# Patient Record
Sex: MALE | Race: WHITE
[De-identification: names, ages, dates, MRNs, and addresses within clinical notes are randomized per-mention and may not be internally consistent; named-entity substitution may affect disease eponyms.]

---

## 2017-01-23 ENCOUNTER — HOSPITAL ENCOUNTER (OUTPATIENT)
Dept: HOSPITAL 53 - M ONCR | Age: 67
End: 2017-01-23
Attending: RADIOLOGY
Payer: COMMERCIAL

## 2017-01-23 DIAGNOSIS — C61: Primary | ICD-10-CM

## 2017-01-25 ENCOUNTER — HOSPITAL ENCOUNTER (OUTPATIENT)
Dept: HOSPITAL 53 - M ONCR | Age: 67
End: 2017-01-25
Attending: RADIOLOGY
Payer: COMMERCIAL

## 2017-01-25 DIAGNOSIS — C61: Primary | ICD-10-CM

## 2017-02-06 ENCOUNTER — HOSPITAL ENCOUNTER (EMERGENCY)
Dept: HOSPITAL 53 - M ED | Age: 67
Discharge: HOME | End: 2017-02-06
Payer: MEDICARE

## 2017-02-06 DIAGNOSIS — Z79.899: ICD-10-CM

## 2017-02-06 DIAGNOSIS — E78.00: ICD-10-CM

## 2017-02-06 DIAGNOSIS — I10: ICD-10-CM

## 2017-02-06 DIAGNOSIS — Z85.46: ICD-10-CM

## 2017-02-06 DIAGNOSIS — Z72.0: ICD-10-CM

## 2017-02-06 DIAGNOSIS — R07.9: Primary | ICD-10-CM

## 2017-02-06 LAB
ANION GAP SERPL CALC-SCNC: 9 MEQ/L (ref 8–16)
BASOPHILS # BLD AUTO: 0 K/MM3 (ref 0–0.2)
BASOPHILS NFR BLD AUTO: 0.7 % (ref 0–1)
BUN SERPL-MCNC: 8 MG/DL (ref 7–18)
CALCIUM SERPL-MCNC: 9.3 MG/DL (ref 8.8–10.2)
CHLORIDE SERPL-SCNC: 99 MEQ/L (ref 98–107)
CO2 SERPL-SCNC: 28 MEQ/L (ref 21–32)
CREAT SERPL-MCNC: 0.78 MG/DL (ref 0.7–1.3)
EOSINOPHIL # BLD AUTO: 0.2 K/MM3 (ref 0–0.5)
EOSINOPHIL NFR BLD AUTO: 3.1 % (ref 0–3)
ERYTHROCYTE [DISTWIDTH] IN BLOOD BY AUTOMATED COUNT: 13 % (ref 11.5–14.5)
GFR SERPL CREATININE-BSD FRML MDRD: > 60 ML/MIN/{1.73_M2} (ref 49–?)
GLUCOSE SERPL-MCNC: 101 MG/DL (ref 80–110)
LARGE UNSTAINED CELL #: 0.2 K/MM3 (ref 0–0.4)
LARGE UNSTAINED CELL %: 3 % (ref 0–4)
LYMPHOCYTES # BLD AUTO: 1 K/MM3 (ref 1.5–4.5)
LYMPHOCYTES NFR BLD AUTO: 15.7 % (ref 24–44)
MCH RBC QN AUTO: 32.2 PG (ref 27–33)
MCHC RBC AUTO-ENTMCNC: 34.4 G/DL (ref 32–36.5)
MCV RBC AUTO: 93.8 FL (ref 80–96)
MONOCYTES # BLD AUTO: 0.4 K/MM3 (ref 0–0.8)
MONOCYTES NFR BLD AUTO: 6.2 % (ref 0–5)
NEUTROPHILS # BLD AUTO: 4.6 K/MM3 (ref 1.8–7.7)
NEUTROPHILS NFR BLD AUTO: 71.2 % (ref 36–66)
PLATELET # BLD AUTO: 241 K/MM3 (ref 150–450)
POTASSIUM SERPL-SCNC: 3.1 MEQ/L (ref 3.5–5.1)
SODIUM SERPL-SCNC: 136 MEQ/L (ref 136–145)
WBC # BLD AUTO: 6.5 K/MM3 (ref 4–10)

## 2017-02-06 NOTE — REP
Chest one-view

 

HISTORY: Chest pain

 

Comparison: 05/04/2016

 

The lungs are clear.  The heart is normal in size.  The pulmonary vasculature is

normal in appearance.

 

Impression:  No acute disease.

 

 

Signed by

Andrea Lovett MD 02/06/2017 05:46 P

## 2017-02-06 NOTE — EDDOCDS
Nurse's Notes                                                                                     

[facility]                                                                         

Name: Francesco Castañeda                                                                               

Age: 66 yrs                                                                                       

Sex: Male                                                                                         

: 1950                                                                                   

MRN: [id number]                                                                                     

Arrival Date: 2017                                                                          

Time: 16:46                                                                                       

Account#: [id number]                                                                              

Bed 12                                                                                            

Private MD: Donavon Carlos M                                                                      

Diagnosis: Chest pain, unspecified                                                                

                                                                                                  

Presentation:                                                                                     

                                                                                             

16:54 Presenting complaint: Patient states: pt c/o chest pain since last night. denies sob.   ead 

      Aspirin was not taken prior to arrival. Adult Sepsis Screening: The patient does not        

      have new or worsening altered mentation. Patient's respiratory rate is less than 22.        

      Systolic blood pressure is greater than 100. Patient has a qSOFA score of 0- Negative       

      Sepsis Screen. Suicide/Homicide risk assessment- the patient denies having any suicidal     

      and/or homicidal ideations and does not present with any other emotional, behavioral or     

      mental health complaints.  Status: Patient is not a  or       

      dependent. Transition of care: patient was not received from another setting of care.       

16:54 Acuity: MAYI Level 3                                                                     ead 

16:54 Method Of Arrival: Walkin/Carried/Asstd                                                 ead 

17:00 Red Flag criteria, patient assessed and taken directly to a bed.                        ead 

                                                                                                  

Triage Assessment:                                                                                

16:57 General: Appears in no apparent distress, Behavior is appropriate for age, cooperative. ead 

      Pain: Location: diaphragm and left breast Pain currently is 3 out of 10 on a pain           

      scale. At worst was 5 out of 10 on a pain scale. Neurological: No deficits noted.           

      Cardiovascular: Chest pain is described as mild, radiates Does not radiate. episodes        

      are intermittent began last night. Respiratory: Denies shortness of breath. Derm: Skin      

      is pink, warm & dry.                                                                      

                                                                                                  

Historical:                                                                                       

- Home Meds:                                                                                      

1. Chlorthalidone Oral once daily                                                               

2. Omeprazole Oral once daily                                                                   

3. Amlodipine Oral once daily                                                                   

4. Acyclovir Unknown Oral once a day                                                            

5. Oxybutynin Chloride Oral once daily                                                          

6. atorvastatin oral oral once daily                                                            

- PMHx: prostate cancer; Hypertension; Hypercholesterolemia;                                      

- PSHx: Right Undescended Testicle Surgery; Right Femur/Hip Fracture Repair; bilateral            

shoulder fractures;                                                                             

- Social history: Smoking status: Patient uses tobacco products, current every day                

smoker. No barriers to communication noted, The patient speaks fluent English, Speaks           

appropriately for age, Smoking status: .                                                        

- Family history: Not pertinent.                                                                  

- : The pt / caregiver states he / she is not on anticoagulants. Home medication list             

is obtained from the patient.                                                                   

- Exposure Risk Screening:: None identified.                                                      

                                                                                                  

                                                                                                  

Screenin:50 Screening information is obtained from the patient. Fall risk: No risks identified.     rs3 

      Assistance ADL's: requires no assistance with activities of daily living. Abuse/DV          

      Screen: The patient / caregiver reports he/she is: not in a situation that causes fear,     

      pain or injury. Nutritional screening: No deficits noted. Advance Directives:               

      Currently, there is no health care proxy. home support is adequate.                         

                                                                                                  

Assessment:                                                                                       

17:49 General: Appears in no apparent distress, Behavior is appropriate for age, cooperative. rs3 

      Pain: Location: chest. Neurological: Level of Consciousness is awake, alert, Oriented       

      to person, place, time. Cardiovascular: Rhythm is regular Chest pain is denied.             

      Respiratory: Airway is patent Respiratory effort is even, unlabored, Respiratory            

      pattern is regular, symmetrical, Breath sounds are clear bilaterally. Derm: Skin is         

      pink, warm & dry.                                                                         

18:37 General: Appears in no apparent distress, Behavior is cooperative, denies of chest      rs3 

      pain/distress. resting comfortable on stretcher. waiting for test results. .                

19:05 General: Verbal report given by Vidhi CANTRELL RN. Assumed care of patient at this time..      kas2

                                                                                                  

Vital Signs:                                                                                      

16:48  / 73; Pulse 90; Resp 18; Temp 97.6(O); Pulse Ox 96% on R/A; Weight 82.1 kg (R);  ct3 

      Height 5 ft. 9 in. (175.26 cm) (R); Pain 5/10;                                              

19:06  / 83; Pulse 85; Resp 18; Temp 99.0(TE); Pulse Ox 95% on R/A; Pain 0/10;          mdr 

16:48 Body Mass Index 26.73 (82.10 kg, 175.26 cm)                                             ct3 

                                                                                                  

Vitals:                                                                                           

16:48 Log In Time: 2017 at 16:45.                                                ct3 

16:49 RN notified that patient meets Red Flag criteria.                                       ct3 

                                                                                                  

ED Course:                                                                                        

16:47 Patient visited by Alyssa Bell PCA.                                              ct3 

16:47 Donavon Carlos is Private Physician.                                                     ct3 

16:47 Patient moved to Waiting                                                                ct3 

16:52 Arminda Blakely,RN is Primary Nurse.                                                 ead 

16:52 Patient moved to 12                                                                     ead 

16:55 Triage Initiated                                                                        ead 

16:58 Cardiac monitor on. Pulse ox on. NIBP on.                                               ead 

17:02 Patient visited by Omar Kincaid.                                                       jml1

17:02 EKG done. (by ED staff). Reviewed by Maja Lundborg-Gray MD.                             jml1

17:08 Lundborg-Gray, Maja, MD is Attending Physician.                                         ml  

17:08 Patient visited by Lundborg-Gray, Maja, MD.                                             ml  

17:49 Patient visited by Arminda Balkely RN.                                               rs3 

17:49 D-Dimer Quant Sent.                                                                     rs3 

17:49 CBC with Diff Sent.                                                                     rs3 

17:49 MED Profile Sent.                                                                       rs3 

17:49 CIP Sent.                                                                               rs3 

17:49 Troponin Sent.                                                                          rs3 

17:50 Inserted saline lock: 20 gauge in left antecubital area and blood collected. Labs       rs3 

      drawn. (by ED staff).                                                                       

18:28 Chest, 1 View Returned.                                                                 EDMS

18:37 Patient visited by Arminda Blakely RN.                                               rs3 

18:55 Donavon Carlos is Referral Physician.                                                    ml  

19:05 Patient visited by Fawn Yi RN.                                                        kas2

19:06 Patient visited by Esequiel Norman PCA.                                                 mdr 

19:13 Discontinued IV bleeding controlled, pressure dressing applied, No redness/swelling at  kas2

      site. No procedures done that require assistance.                                           

19:14 Patient visited by Fawn Yi RN.                                                        kas2

19:14 The patient / caregiver is instructed regarding the plan of care and ED course.         kas2

                                                                                                  

Administered Medications:                                                                         

17:49 Drug: Aspirin 324 mg [aspirin 81 mg chewable tablet (4 tabs)] Route: PO;                rs3 

                                                                                                  

                                                                                                  

Order Results:                                                                                    

Lab Order: CBC with Diff; SPEC'M 17 17:36                                                   

      Test: WHITE BLOOD COUNT; Value: 6.5; Range: 4.0-10.0; Units: K/mm3; Status: F               

      Test: RED BLOOD COUNT; Value: 4.35; Range: 4.30-6.10; Units: M/mm3; Status: F               

      Test: HEMOGLOBIN; Value: 14.0; Range: 14.0-18.0; Units: g/dl; Status: F                     

      Test: HEMATOCRIT; Value: 40.8; Range: 42.0-52.0; Abnormal: Below low normal; Units: %;      

      Status: F                                                                                   

      Test: MEAN CORPUSCULAR VOLUME; Value: 93.8; Range: 80.0-96.0; Units: fl; Status: F          

      Test: MEAN CORPUSCULAR HEMOGLOBIN; Value: 32.2; Range: 27.0-33.0; Units: pg; Status: F      

      Test: MEAN CORPUSCULAR HGB CONC; Value: 34.4; Range: 32.0-36.5; Units: g/dl; Status: F      

      Test: RED CELL DISTRIBUTION WIDTH; Value: 13.0; Range: 11.5-14.5; Units: %; Status: F       

      Test: PLATELET COUNT, AUTOMATED; Value: 241; Range: 150-450; Units: k/mm3; Status: F        

      Test: NEUTROPHILS %; Value: 71.2; Range: 36.0-66.0; Abnormal: Above high normal; Units:     

      %; Status: F                                                                                

      Test: LYMPH %; Value: 15.7; Range: 24.0-44.0; Abnormal: Below low normal; Units: %;         

      Status: F                                                                                   

      Test: MONO %; Value: 6.2; Range: 0.0-5.0; Abnormal: Above high normal; Units: %;            

      Status: F                                                                                   

      Test: EOS %; Value: 3.1; Range: 0.0-3.0; Abnormal: Above high normal; Units: %; Status:     

      F                                                                                           

      Test: BASO %; Value: 0.7; Range: 0.0-1.0; Units: %; Status: F                               

      Test: LARGE UNSTAINED CELL %; Value: 3.0; Range: 0.0-4.0; Units: %; Status: F               

      Test: NEUTROPHILS #; Value: 4.6; Range: 1.8-7.7; Units: K/mm3; Status: F                    

      Test: LYMPH #; Value: 1.0; Range: 1.5-4.5; Abnormal: Below low normal; Units: K/mm3;        

      Status: F                                                                                   

      Test: MONO #; Value: 0.4; Range: 0.0-0.8; Units: K/mm3; Status: F                           

      Test: EOS #; Value: 0.2; Range: 0.0-0.50; Units: K/mm3; Status: F                           

      Test: BASO #; Value: 0.0; Range: 0.0-0.2; Units: K/mm3; Status: F                           

      Test: LARGE UNSTAINED CELL #; Value: 0.2; Range: 0.0-0.4; Units: K/mm3; Status: F           

Lab Order: MED Profile; SPEC'M 17 17:36                                                     

      Test: GLUCOSE, FASTING; Value: 101; Range: ; Units: MG/DL; Status: F                  

      Test: BLOOD UREA NITROGEN; Value: 8; Range: 7-18; Units: MG/DL; Status: F                   

      Test: CREATININE FOR GFR; Value: 0.78; Range: 0.70-1.30; Units: MG/DL; Status: F            

      Test: GLOMERULAR FILTRATION RATE; Value: > 60.0; Range: >49; Status: F                      

      Test: SODIUM LEVEL; Value: 136; Range: 136-145; Units: MEQ/L; Status: F                     

      Test: POTASSIUM SERUM; Value: 3.1; Range: 3.5-5.1; Abnormal: Below low normal; Units:       

      MEQ/L; Status: F                                                                            

      Test: CHLORIDE LEVEL; Value: 99; Range: ; Units: MEQ/L; Status: F                     

      Test: CARBON DIOXIDE LEVEL; Value: 28; Range: 21-32; Units: MEQ/L; Status: F                

      Test: ANION GAP; Value: 9; Range: 8-16; Units: MEQ/L; Status: F                             

      Test: CALCIUM LEVEL; Value: 9.3; Range: 8.8-10.2; Units: MG/DL; Status: F                   

      Test Note: &nbsp;; Units are mL/min/1.73 m2 Chronic Kidney Disease Staging per NKF:       

      Stage I & II GFR >=60 Normal to Mildly Decreased Stage III GFR 30-59 Moderately           

      Decreased Stage IV GFR 15-29 Severely Decreased Stage V GFR <15 Very Little GFR Left        

      ESRD GFR <15 on RRT                                                                         

Lab Order: CIP; SPEC'17 17:36                                                             

      Test: CPK CREATINE PHOSPHOKINASE; Value: 179; Range: ; Units: U/L; Status: F          

      Test: CK-MB VALUE MASS; Value: 2.6; Range: 0.0-3.6; Units: NG/ML; Status: F                 

      Test: MB/CK RELATIVE INDEX; Value: 1.45; Range: < OR =4; Status: F                          

      Test Note: &nbsp;; DIAGNOSIS CRITERIA MMB ng/ml Relative Index (RI) NON-AMI < or = 5      

      N/A GRAY ZONE > 5 < or = 4 AMI > 5 > 4                                                      

Lab Order: Troponin; SPEC'M 17 17:36                                                        

      Test: TROPONIN I; Value: < 0.02; Range: < 0.10; Units: NG/ML; Status: F                     

      Test Note: &nbsp;; Troponin I Reference Interval for Siemens Ennis LOCI: 99th             

      Percentile= 0.00-0.045 ng/ml Risk Stratification: <= 0.10 ng/ml Decreased Risk for          

      Adverse Clinical Events. 0.10-1.50 ng/ml Increased Risk for Adverse Clinical Events.        

      Evaluation of additional criterion and/or repeat testing in 2-6 hours is suggested to       

      rule out myocardial damage. >= 1.50 ng/ml Indicative of Myocardial Injury.                  

Lab Order: D-Dimer Quant; SPEC'M 17 17:36                                                   

      Test: D-DIMER QUANT; Value: 299.7; Range: <500; Units: ng/ml; Status: F                     

                                                                                                  

Radiology Order: Chest, 1 View                                                                    

      Test: Chest, 1 View                                                                         

      REASON FOR EXAMINATION: Chest Pain; Chest one-view; ; HISTORY: Chest pain; ;                

      Comparison: 2016; ; The lungs are clear. The heart is normal in size. The             

      pulmonary vasculature is; normal in appearance.; ; Impression: No acute disease.; ; ;       

      Signed by; Andrea Lovett MD 2017 05:46 P;                                             

Outcome:                                                                                          

18:56 Discharge ordered by Provider.                                                            

19:13 Discharge Assessment: patient administered narcotics - no. The following High Risk      [facility]

      Discharge criteria are identified: None. Discharged to home ambulatory. Condition: good     

      Condition: stable Condition: improved.                                                      

19:14 No special radiology studies were completed. Property :Personal belongings accompany Pt.[facility]

19:14 Patient left the ED.                                                                    [facility]

                                                                                                  

Signatures:                                                                                       

Dispatcher [employer]                           EDMS Lundborg-Gray, Maja, MD MD ml Soosairaj,Rosemary,RN                   RN   rs3                                                  

Alyssa Bell, PCA                  PCA  ct3                                                  

Omar Kincaid                                jml1                                                 

Susan Barkley RN                        RN   Esequiel Jiménez, PCA                     PCA  Fawn Guzman RN                            RN   kas2                                                 

                                                                                                  

**************************************************************************************************

MTDD

## 2017-02-06 NOTE — EDDOCDS
Physician Documentation                                                                           

[facility]                                                                         

Name: Francesco Castañeda                                                                               

Age: 66 yrs                                                                                       

Sex: Male                                                                                         

: 1950                                                                                   

MRN: [id number]                                                                                     

Arrival Date: 2017                                                                          

Time: 16:46                                                                                       

Account#: [id number]                                                                              

Bed 12                                                                                            

Private MD: Donavon Carlos M                                                                      

Disposition:                                                                                      

17 18:56 Discharged to Home/Self Care. Impression: Chest pain, unspecified.                 

- Condition is Stable.                                                                            

- Discharge Instructions: Nonspecific Chest Pain.                                                 

- Prescriptions for Aspirin 81 mg - take 1 tablet by ORAL route once daily; 90 tablet.            

- Medication Reconciliation, Local Pharmacy Hours form.                                           

- Follow up: Donavon Carlos; When: 1 - 2 days.                                                     

- Problem is new.                                                                                 

- Symptoms have improved.                                                                         

- Notes: call to follow up w Robert Carlos. reurn if worsening symptomg                              

                                                                                                  

                                                                                                  

Historical:                                                                                       

- Home Meds:                                                                                      

1. Chlorthalidone Oral once daily                                                               

2. Omeprazole Oral once daily                                                                   

3. Amlodipine Oral once daily                                                                   

4. Acyclovir Unknown Oral once a day                                                            

5. Oxybutynin Chloride Oral once daily                                                          

6. atorvastatin oral oral once daily                                                            

- PMHx: prostate cancer; Hypertension; Hypercholesterolemia;                                      

- PSHx: Right Undescended Testicle Surgery; Right Femur/Hip Fracture Repair; bilateral            

shoulder fractures;                                                                             

- Social history: Smoking status: Patient uses tobacco products, current every day                

smoker. No barriers to communication noted, The patient speaks fluent English, Speaks           

appropriately for age, Smoking status: .                                                        

- Family history: Not pertinent.                                                                  

- : The pt / caregiver states he / she is not on anticoagulants. Home medication list             

is obtained from the patient.                                                                   

- Exposure Risk Screening:: None identified.                                                      

                                                                                                  

                                                                                                  

Vital Signs:                                                                                      

                                                                                             

16:48  / 73; Pulse 90; Resp 18; Temp 97.6(O); Pulse Ox 96% on R/A; Weight 82.1 kg / 181 ct3 

      lbs (R); Height 5 ft. 9 in. (175.26 cm) (R); Pain 5/10;                                     

19:06  / 83; Pulse 85; Resp 18; Temp 99.0(TE); Pulse Ox 95% on R/A; Pain 0/10;          mdr 

16:48 Body Mass Index 26.73 (82.10 kg, 175.26 cm)                                             ct3 

                                                                                                  

MDM:                                                                                              

16:55 ECG WITH READING ER PHYS+CARDIAG ordered.                                               EDMS

17:30 IV Saline Lock ordered.                                                                 ml  

17:31 Aspirin Chewable Tablet 324 mg PO once ordered.                                         ml  

17:31 CBC with Diff Ordered.                                                                  EDMS

17:31 MED Profile Ordered.                                                                    EDMS

17:32 CIP Ordered.                                                                            EDMS

17:32 Troponin Ordered.                                                                       EDMS

17:32 D-Dimer Quant Ordered.                                                                  EDMS

17:32 Chest, 1 View Ordered.                                                                  EDMS

18:32 CBC with Diff Reviewed.                                                                 ml  

18:32 MED Profile Reviewed.                                                                   ml  

18:32 CIP Reviewed.                                                                           ml  

18:32 Troponin Reviewed.                                                                      ml  

18:32 D-Dimer Quant Reviewed.                                                                 ml  

18:32 Chest, 1 View Reviewed.                                                                 ml  

18:37 Financial registration complete.                                                        gjb 

18:57 Misc. Nursing Order ordered.                                                            ml  

                                                                                                  

Administered Medications:                                                                         

17:49 Drug: Aspirin 324 mg [aspirin 81 mg chewable tablet (4 tabs)] Route: PO;                rs3 

                                                                                                  

                                                                                                  

Signatures:                                                                                       

Dispatcher [employer]                           EDMS                                                 

Lundborg-Gray, Maja, MD MD ml Dunaway,Emily,RN                        Cassidy Workman Kim, RN                            RN   [facility]                                                 

Arminda Blakely RN   rs3                                                  

                                                                                                  

**************************************************************************************************

SAAD

## 2017-02-06 NOTE — ECGEPIP
Stationary ECG Study

                           [facility] [place] - ED

                                       

                                       Test Date:    2017

Pat Name:     MU JESUS            Department:   

Patient ID:   [id number]                 Room:         -

Gender:       M                        Technician:   VIELKA

:          1950               Requested By: Maja Lundborg-Gray 

Order Number: [id number]     Reading MD:   Magy Barrientos

                                 Measurements

Intervals                              Axis          

Rate:         85                       P:            54

MO:           201                      QRS:          22

QRSD:         106                      T:            37

QT:           342                                    

QTc:          407                                    

                           Interpretive Statements

SINUS RHYTHM

NSTTW ABNORMALITY COMPARED 16 CLINICAL CORRELATION

Electronically Signed On 2017 20:15:17 EST by Magy Barrientos

## 2017-02-08 NOTE — EDDOCDS
Physician Documentation                                                                           

[facility]                                                                         

Name: Francesco Castañeda                                                                               

Age: 66 yrs                                                                                       

Sex: Male                                                                                         

: 1950                                                                                   

MRN: [id number]                                                                                     

Arrival Date: 2017                                                                          

Time: 16:46                                                                                       

Account#: [id number]                                                                              

Bed 12                                                                                            

Private MD: Donavon Carlos M                                                                      

Disposition:                                                                                      

17 18:56 Discharged to Home/Self Care. Impression: Chest pain, unspecified.                 

- Condition is Stable.                                                                            

- Discharge Instructions: Nonspecific Chest Pain.                                                 

- Prescriptions for Aspirin 81 mg - take 1 tablet by ORAL route once daily; 90 tablet.            

- Medication Reconciliation, Local Pharmacy Hours form.                                           

- Follow up: Donavon Carlos; When: 1 - 2 days.                                                     

- Problem is new.                                                                                 

- Symptoms have improved.                                                                         

- Notes: call to follow up w Robert Carlos. reurn if worsening symptomg                              

                                                                                                  

                                                                                                  

Historical:                                                                                       

- Home Meds:                                                                                      

1. Chlorthalidone Oral once daily                                                               

2. Omeprazole Oral once daily                                                                   

3. Amlodipine Oral once daily                                                                   

4. Acyclovir Unknown Oral once a day                                                            

5. Oxybutynin Chloride Oral once daily                                                          

6. atorvastatin oral oral once daily                                                            

- PMHx: prostate cancer; Hypertension; Hypercholesterolemia;                                      

- PSHx: Right Undescended Testicle Surgery; Right Femur/Hip Fracture Repair; bilateral            

shoulder fractures;                                                                             

- Social history: Smoking status: Patient uses tobacco products, current every day                

smoker. No barriers to communication noted, The patient speaks fluent English, Speaks           

appropriately for age, Smoking status: .                                                        

- Family history: Not pertinent.                                                                  

- : The pt / caregiver states he / she is not on anticoagulants. Home medication list             

is obtained from the patient.                                                                   

- Exposure Risk Screening:: None identified.                                                      

                                                                                                  

                                                                                                  

Vital Signs:                                                                                      

                                                                                             

16:48  / 73; Pulse 90; Resp 18; Temp 97.6(O); Pulse Ox 96% on R/A; Weight 82.1 kg / 181 ct3 

      lbs (R); Height 5 ft. 9 in. (175.26 cm) (R); Pain 5/10;                                     

19:06  / 83; Pulse 85; Resp 18; Temp 99.0(TE); Pulse Ox 95% on R/A; Pain 0/10;          mdr 

16:48 Body Mass Index 26.73 (82.10 kg, 175.26 cm)                                             ct3 

                                                                                                  

MDM:                                                                                              

16:55 ECG WITH READING ER PHYS+CARDIAG ordered.                                               EDMS

17:30 IV Saline Lock ordered.                                                                 ml  

17:31 Aspirin Chewable Tablet 324 mg PO once ordered.                                         ml  

17:31 CBC with Diff Ordered.                                                                  EDMS

17:31 MED Profile Ordered.                                                                    EDMS

17:32 CIP Ordered.                                                                            EDMS

17:32 Troponin Ordered.                                                                       EDMS

17:32 D-Dimer Quant Ordered.                                                                  EDMS

17:32 Chest, 1 View Ordered.                                                                  EDMS

18:32 CBC with Diff Reviewed.                                                                 ml  

18:32 MED Profile Reviewed.                                                                   ml  

18:32 CIP Reviewed.                                                                           ml  

18:32 Troponin Reviewed.                                                                      ml  

18:32 D-Dimer Quant Reviewed.                                                                 ml  

18:32 Chest, 1 View Reviewed.                                                                 ml  

18:37 Financial registration complete.                                                        gjb 

18:57 Misc. Nursing Order ordered.                                                              

19:37 [facility] Payment Agreement was scanned into [employer] and attached to record.               [facility] 

                                                                                             

11:11 T-Sheet-- Draft Copy was scanned into [employer] and attached to record.                   gb  

11:12 ECG/EKG was scanned into [employer] and attached to record.                                gb  

11:12 Radiology Report was scanned into [employer] and attached to record.                       gb  

                                                                                                  

Administered Medications:                                                                         

                                                                                             

17:49 Drug: Aspirin 324 mg [aspirin 81 mg chewable tablet (4 tabs)] Route: PO;                rs3 

                                                                                                  

                                                                                                  

Signatures:                                                                                       

Dispatcher [employer]                           EDMS Lundborg-Gray, Maja, MD MD ml Barnhardt, Gloria, Reg                  Reg                                                     

Susan Barkley,RN                        RN   Cassidy Shoemaker Kim, RN                            RN   [facility]                                                 

Arminda Blakely RN   rs3                                                  

                                                                                                  

The chart was reviewed and I authenticate all verbal orders and agree with the evaluation and 
treatment provided.Attachments:

19:37 [facility] Payment Agreement                                                                [facility] 

                                                                                             

11:11 T-Sheet-- Draft Copy                                                                    gb  

11:12 ECG/EKG                                                                                 gb  

                                                                                                  

**************************************************************************************************



*** Chart Complete ***
MTDD

## 2017-02-08 NOTE — EDDOCDS
Physician Documentation                                                                           

[facility]                                                                         

Name: Francesco Castañeda                                                                               

Age: 66 yrs                                                                                       

Sex: Male                                                                                         

: 1950                                                                                   

MRN: [id number]                                                                                     

Arrival Date: 2017                                                                          

Time: 16:46                                                                                       

Account#: [id number]                                                                              

Bed 12                                                                                            

Private MD: Donavon Carlos M                                                                      

Disposition:                                                                                      

17 18:56 Discharged to Home/Self Care. Impression: Chest pain, unspecified.                 

- Condition is Stable.                                                                            

- Discharge Instructions: Nonspecific Chest Pain.                                                 

- Prescriptions for Aspirin 81 mg - take 1 tablet by ORAL route once daily; 90 tablet.            

- Medication Reconciliation, Local Pharmacy Hours form.                                           

- Follow up: Donavon Carlos; When: 1 - 2 days.                                                     

- Problem is new.                                                                                 

- Symptoms have improved.                                                                         

- Notes: call to follow up w Robert Carlos. reurn if worsening symptomg                              

                                                                                                  

                                                                                                  

Historical:                                                                                       

- Home Meds:                                                                                      

1. Chlorthalidone Oral once daily                                                               

2. Omeprazole Oral once daily                                                                   

3. Amlodipine Oral once daily                                                                   

4. Acyclovir Unknown Oral once a day                                                            

5. Oxybutynin Chloride Oral once daily                                                          

6. atorvastatin oral oral once daily                                                            

- PMHx: prostate cancer; Hypertension; Hypercholesterolemia;                                      

- PSHx: Right Undescended Testicle Surgery; Right Femur/Hip Fracture Repair; bilateral            

shoulder fractures;                                                                             

- Social history: Smoking status: Patient uses tobacco products, current every day                

smoker. No barriers to communication noted, The patient speaks fluent English, Speaks           

appropriately for age, Smoking status: .                                                        

- Family history: Not pertinent.                                                                  

- : The pt / caregiver states he / she is not on anticoagulants. Home medication list             

is obtained from the patient.                                                                   

- Exposure Risk Screening:: None identified.                                                      

                                                                                                  

                                                                                                  

Vital Signs:                                                                                      

                                                                                             

16:48  / 73; Pulse 90; Resp 18; Temp 97.6(O); Pulse Ox 96% on R/A; Weight 82.1 kg / 181 ct3 

      lbs (R); Height 5 ft. 9 in. (175.26 cm) (R); Pain 5/10;                                     

19:06  / 83; Pulse 85; Resp 18; Temp 99.0(TE); Pulse Ox 95% on R/A; Pain 0/10;          mdr 

16:48 Body Mass Index 26.73 (82.10 kg, 175.26 cm)                                             ct3 

                                                                                                  

MDM:                                                                                              

16:55 ECG WITH READING ER PHYS+CARDIAG ordered.                                               EDMS

17:30 IV Saline Lock ordered.                                                                 ml  

17:31 Aspirin Chewable Tablet 324 mg PO once ordered.                                         ml  

17:31 CBC with Diff Ordered.                                                                  EDMS

17:31 MED Profile Ordered.                                                                    EDMS

17:32 CIP Ordered.                                                                            EDMS

17:32 Troponin Ordered.                                                                       EDMS

17:32 D-Dimer Quant Ordered.                                                                  EDMS

17:32 Chest, 1 View Ordered.                                                                  EDMS

18:32 CBC with Diff Reviewed.                                                                 ml  

18:32 MED Profile Reviewed.                                                                   ml  

18:32 CIP Reviewed.                                                                           ml  

18:32 Troponin Reviewed.                                                                      ml  

18:32 D-Dimer Quant Reviewed.                                                                 ml  

18:32 Chest, 1 View Reviewed.                                                                 ml  

18:37 Financial registration complete.                                                        gjb 

18:57 Misc. Nursing Order ordered.                                                              

19:37 [facility] Payment Agreement was scanned into [employer] and attached to record.               [facility] 

                                                                                             

11:11 T-Sheet-- Draft Copy was scanned into [employer] and attached to record.                   gb  

11:12 ECG/EKG was scanned into [employer] and attached to record.                                gb  

11:12 Radiology Report was scanned into [employer] and attached to record.                       gb  

                                                                                                  

Administered Medications:                                                                         

                                                                                             

17:49 Drug: Aspirin 324 mg [aspirin 81 mg chewable tablet (4 tabs)] Route: PO;                rs3 

                                                                                                  

                                                                                                  

Signatures:                                                                                       

Dispatcher [employer]                           EDMS Lundborg-Gray, Maja, MD MD ml Barnhardt, Gloria, Reg                  Reg                                                     

Susan Barkley,RN                        RN   Cassidy Shoemaker Kim, RN                            RN   [facility]                                                 

Arminda Blakely RN   rs3                                                  

                                                                                                  

The chart was reviewed and I authenticate all verbal orders and agree with the evaluation and 
treatment provided.Attachments:

19:37 [facility] Payment Agreement                                                                [facility] 

                                                                                             

11:11 T-Sheet-- Draft Copy                                                                    gb  

11:12 ECG/EKG                                                                                 gb  

                                                                                                  

**************************************************************************************************



*** Chart Complete ***
MTDD

## 2017-04-03 ENCOUNTER — HOSPITAL ENCOUNTER (OUTPATIENT)
Dept: HOSPITAL 53 - M LAB | Age: 67
End: 2017-04-03
Attending: UROLOGY
Payer: MEDICARE

## 2017-04-03 DIAGNOSIS — C61: Primary | ICD-10-CM

## 2017-06-19 ENCOUNTER — HOSPITAL ENCOUNTER (OUTPATIENT)
Dept: HOSPITAL 53 - M LAB | Age: 67
End: 2017-06-19
Attending: UROLOGY
Payer: MEDICARE

## 2017-06-19 ENCOUNTER — HOSPITAL ENCOUNTER (OUTPATIENT)
Dept: HOSPITAL 53 - M LAB | Age: 67
End: 2017-06-19
Attending: PHYSICIAN ASSISTANT
Payer: MEDICARE

## 2017-06-19 DIAGNOSIS — R53.83: ICD-10-CM

## 2017-06-19 DIAGNOSIS — R35.0: ICD-10-CM

## 2017-06-19 DIAGNOSIS — E78.4: ICD-10-CM

## 2017-06-19 DIAGNOSIS — I10: Primary | ICD-10-CM

## 2017-06-19 DIAGNOSIS — C61: Primary | ICD-10-CM

## 2017-06-19 LAB
ALBUMIN SERPL BCG-MCNC: 3.7 GM/DL (ref 3.2–5.2)
ALBUMIN/GLOB SERPL: 1.16 {RATIO} (ref 1–1.93)
ALP SERPL-CCNC: 82 U/L (ref 45–117)
ALT SERPL W P-5'-P-CCNC: 46 U/L (ref 12–78)
ANION GAP SERPL CALC-SCNC: 8 MEQ/L (ref 8–16)
AST SERPL-CCNC: 25 U/L (ref 15–37)
BILIRUB SERPL-MCNC: 0.4 MG/DL (ref 0.2–1)
BUN SERPL-MCNC: 16 MG/DL (ref 7–18)
CALCIUM SERPL-MCNC: 9.6 MG/DL (ref 8.8–10.2)
CHLORIDE SERPL-SCNC: 101 MEQ/L (ref 98–107)
CHOLEST SERPL-MCNC: 226 MG/DL (ref ?–200)
CO2 SERPL-SCNC: 28 MEQ/L (ref 21–32)
CREAT SERPL-MCNC: 0.84 MG/DL (ref 0.7–1.3)
GFR SERPL CREATININE-BSD FRML MDRD: > 60 ML/MIN/{1.73_M2} (ref 49–?)
GLUCOSE SERPL-MCNC: 106 MG/DL (ref 80–110)
POTASSIUM SERPL-SCNC: 3.7 MEQ/L (ref 3.5–5.1)
PROT SERPL-MCNC: 6.9 GM/DL (ref 6.4–8.2)
SODIUM SERPL-SCNC: 137 MEQ/L (ref 136–145)
TRIGL SERPL-MCNC: 307 MG/DL (ref ?–150)

## 2017-06-19 PROCEDURE — 80061 LIPID PANEL: CPT

## 2017-06-19 PROCEDURE — 84153 ASSAY OF PSA TOTAL: CPT

## 2017-06-19 PROCEDURE — 87086 URINE CULTURE/COLONY COUNT: CPT

## 2017-06-19 PROCEDURE — 36415 COLL VENOUS BLD VENIPUNCTURE: CPT

## 2017-06-19 PROCEDURE — 84403 ASSAY OF TOTAL TESTOSTERONE: CPT

## 2017-06-19 PROCEDURE — 84443 ASSAY THYROID STIM HORMONE: CPT

## 2017-06-19 PROCEDURE — 81001 URINALYSIS AUTO W/SCOPE: CPT

## 2017-06-19 PROCEDURE — 80053 COMPREHEN METABOLIC PANEL: CPT

## 2017-09-08 ENCOUNTER — HOSPITAL ENCOUNTER (OUTPATIENT)
Dept: HOSPITAL 53 - M LAB | Age: 67
End: 2017-09-08
Attending: PHYSICIAN ASSISTANT
Payer: MEDICARE

## 2017-09-08 ENCOUNTER — HOSPITAL ENCOUNTER (OUTPATIENT)
Dept: HOSPITAL 53 - M LAB | Age: 67
End: 2017-09-08
Attending: UROLOGY
Payer: MEDICARE

## 2017-09-08 DIAGNOSIS — I10: Primary | ICD-10-CM

## 2017-09-08 DIAGNOSIS — C61: Primary | ICD-10-CM

## 2017-09-08 DIAGNOSIS — C61: ICD-10-CM

## 2017-09-08 LAB
ALBUMIN SERPL BCG-MCNC: 3.7 GM/DL (ref 3.2–5.2)
ALBUMIN/GLOB SERPL: 1.09 {RATIO} (ref 1–1.93)
ALP SERPL-CCNC: 93 U/L (ref 45–117)
ALT SERPL W P-5'-P-CCNC: 60 U/L (ref 12–78)
ANION GAP SERPL CALC-SCNC: 8 MEQ/L (ref 8–16)
AST SERPL-CCNC: 36 U/L (ref 15–37)
BILIRUB SERPL-MCNC: 0.6 MG/DL (ref 0.2–1)
BUN SERPL-MCNC: 12 MG/DL (ref 7–18)
CALCIUM SERPL-MCNC: 9.2 MG/DL (ref 8.8–10.2)
CHLORIDE SERPL-SCNC: 99 MEQ/L (ref 98–107)
CHOLEST SERPL-MCNC: 223 MG/DL (ref ?–200)
CO2 SERPL-SCNC: 30 MEQ/L (ref 21–32)
CREAT SERPL-MCNC: 0.84 MG/DL (ref 0.7–1.3)
GFR SERPL CREATININE-BSD FRML MDRD: > 60 ML/MIN/{1.73_M2} (ref 49–?)
GLUCOSE SERPL-MCNC: 107 MG/DL (ref 80–110)
POTASSIUM SERPL-SCNC: 3.8 MEQ/L (ref 3.5–5.1)
PROT SERPL-MCNC: 7.1 GM/DL (ref 6.4–8.2)
SODIUM SERPL-SCNC: 137 MEQ/L (ref 136–145)
TRIGL SERPL-MCNC: 240 MG/DL (ref ?–150)

## 2017-09-19 ENCOUNTER — HOSPITAL ENCOUNTER (OUTPATIENT)
Dept: HOSPITAL 53 - M SMT | Age: 67
End: 2017-09-19
Attending: UROLOGY
Payer: MEDICARE

## 2017-09-19 DIAGNOSIS — Q53.9: Primary | ICD-10-CM

## 2017-09-20 NOTE — REP
Scrotal ultrasound:

 

A comparison is 07/10/2013.

 

The patient has a known of seven right testis.

 

The right testis is undescended again is identified within the inguinal canal.

The right testis is small size measuring 3.2 x 1.1 x 2.7 cm.  This is not unusual

for an undescended testis.  There is no right testicular mass.

 

Left testis is normal size measuring 5.57 x 3.2 cm.  There is no left testicular

mass.

 

With Doppler assessment there is vascular flow in both right and left testes. The

Doppler resistive index of the intraparenchymal arteries on the right testis is

0.50, left testis 0.53.

 

Impression:

 

Undescended right testicle as a small size.  There is no right testicular mass.

 

Left testis is normal size and otherwise unremarkable.  There is no left

testicular mass.

 

There is a single small calcification in the left testis.

 

There is a small left hydrocele.

 

The right epididymis cannot be identified.  The left epididymis is unremarkable.

Balloon

 

There is a small left hydrocele.

 

No significant interval change.

 

 

Signed by

Juan Elliott MD 09/19/2017 04:20 P

## 2017-10-04 ENCOUNTER — HOSPITAL ENCOUNTER (OUTPATIENT)
Dept: HOSPITAL 53 - M ONCR | Age: 67
End: 2017-10-04
Attending: RADIOLOGY
Payer: MEDICARE

## 2017-10-04 DIAGNOSIS — C61: Primary | ICD-10-CM

## 2017-10-05 NOTE — RADONC
RADIATION ONCOLOGY FOLLOWUP  NOTE

 

DATE:  10/04/2017

 

CHART NUMBER:  

 

DIAGNOSIS:  Prostate cancer.

 

STAGE:  II A, [id number].

 

ECOG PERFORMANCE STATUS:  0.

 

FOLLOWUP NOTE:

Mr. Castañeda is a very pleasant 67-year-old white male with the diagnosis of a stage

II A, [id number], moderate to poorly differentiated Deep score 7 (3-4)

adenocarcinoma of the prostate who is presenting to us today for routine followup

visit 10 months post completion of external beam radiation therapy.

 

The patient presents today reporting that he is doing quite well with no

complaints at this time related to radiation therapy or disease.  He is having no

urinary or bowel difficulties and no bone pain.

 

REVIEW OF SYSTEMS:

The patient's review of systems is noncontributory.  Denies nausea, vomiting,

fevers, chills, night sweats, diplopia, headaches, anxiety or depression,

anorexia, weight loss, visual disturbances, chest pain, urinary or bowel

difficulties, bone pain, or neurological problems.

 

PHYSICAL EXAMINATION:

The patient is a well-developed, well-nourished male in no acute distress.

HEENT exam is normocephalic, atraumatic.  Extraocular movements are intact.

There is no palpable cervical, supraclavicular, infraclavicular, axillary, or

inguinal lymphadenopathy present.

Lungs are clear to auscultation and percussion.

Heart has a regular rate and rhythm.

Abdomen is benign with no hepatosplenomegaly, masses, or tenderness.

Rectal examination reveals a normal anal sphincter tone.  The patient's prostate

bed is smooth with no evidence of nodularity.

Skeletal examination reveals no tenderness to pressure or percussion of the bony

skeleton.

Extremities reveal no clubbing, cyanosis, or edema.

Neurologic exam is grossly intact, as is the remainder of the physical

examination.

 

ASSESSMENT:

The patient is clinically CARMEN at this time and will be seen by us again in 6

months for further followup.  He will also continue be followed by his other

physicians as well.

 

 

 

 

 

 

cc:    MD Donavon Coburn, RPA-C

## 2017-11-13 ENCOUNTER — HOSPITAL ENCOUNTER (OUTPATIENT)
Dept: HOSPITAL 53 - M LAB | Age: 67
End: 2017-11-13
Attending: UROLOGY
Payer: MEDICARE

## 2017-11-13 DIAGNOSIS — C61: Primary | ICD-10-CM

## 2017-12-13 ENCOUNTER — HOSPITAL ENCOUNTER (OUTPATIENT)
Dept: HOSPITAL 53 - M SFHCPLAZ | Age: 67
End: 2017-12-13
Attending: FAMILY MEDICINE
Payer: MEDICARE

## 2017-12-13 DIAGNOSIS — E78.2: Primary | ICD-10-CM

## 2017-12-13 LAB
CHOLEST SERPL-MCNC: 195 MG/DL (ref ?–200)
TRIGL SERPL-MCNC: 155 MG/DL (ref ?–150)

## 2017-12-20 ENCOUNTER — HOSPITAL ENCOUNTER (OUTPATIENT)
Dept: HOSPITAL 53 - M RAD | Age: 67
End: 2017-12-20
Attending: FAMILY MEDICINE
Payer: MEDICARE

## 2017-12-20 DIAGNOSIS — F17.210: ICD-10-CM

## 2017-12-20 DIAGNOSIS — Z12.2: Primary | ICD-10-CM

## 2017-12-20 NOTE — REP
LOW DOSE LUNG SCREENING CT:

 

Low dose lung screening CT performed without IV contrast.

 

There are scattered interstitial fibrotic changes bilaterally. No suspicious

nodules are seen. Calcified granuloma seen in the right middle lobe. No contour

abnormality is seen of the mediastinum or hilar regions. There are mild

atherosclerotic calcifications of the thoracic aorta. Heart is not enlarged. No

pleural effusion is seen.

 

IMPRESSION:

 

Category 1 screening lung CT. No suspicious nodules. Recommend annual screening

with low dose CT in 12 months.

 

 

Signed by

Juan Werner MD 12/21/2017 08:30 P

## 2018-08-21 ENCOUNTER — HOSPITAL ENCOUNTER (OUTPATIENT)
Dept: HOSPITAL 53 - M LAB | Age: 68
End: 2018-08-21
Attending: FAMILY MEDICINE
Payer: COMMERCIAL

## 2018-08-21 ENCOUNTER — HOSPITAL ENCOUNTER (OUTPATIENT)
Dept: HOSPITAL 53 - M LAB | Age: 68
End: 2018-08-21
Attending: UROLOGY
Payer: COMMERCIAL

## 2018-08-21 DIAGNOSIS — C61: Primary | ICD-10-CM

## 2018-08-21 DIAGNOSIS — M17.0: Primary | ICD-10-CM

## 2018-08-21 DIAGNOSIS — M85.88: ICD-10-CM

## 2018-08-21 DIAGNOSIS — E55.9: ICD-10-CM

## 2018-08-21 DIAGNOSIS — E78.2: ICD-10-CM

## 2018-08-21 DIAGNOSIS — M25.461: ICD-10-CM

## 2018-08-21 DIAGNOSIS — M25.462: ICD-10-CM

## 2018-08-21 DIAGNOSIS — C61: ICD-10-CM

## 2018-08-21 LAB
25(OH)D3 SERPL-MCNC: 21.5 NG/ML (ref 30–100)
CHOLEST SERPL-MCNC: 179 MG/DL (ref ?–200)
CHOLESTEROL RISK RATIO: 3.03 (ref ?–5)
HDLC SERPL-MCNC: 59 MG/DL (ref 40–?)
LDL CHOLESTEROL: 69.2 MG/DL (ref ?–100)
NONHDLC SERPL-MCNC: 120 MG/DL
PROSTATIC SPECIFIC AG MONITOR: 0.17 NG/ML (ref ?–4)
TRIGLYCERIDES LEVEL: 254 MG/DL (ref ?–150)

## 2018-08-21 PROCEDURE — 73560 X-RAY EXAM OF KNEE 1 OR 2: CPT

## 2018-10-23 ENCOUNTER — HOSPITAL ENCOUNTER (OUTPATIENT)
Dept: HOSPITAL 53 - M LAB | Age: 68
End: 2018-10-23
Attending: RADIOLOGY
Payer: COMMERCIAL

## 2018-10-23 DIAGNOSIS — C61: Primary | ICD-10-CM

## 2018-10-23 LAB — PROSTATIC SPECIFIC AG MONITOR: 0.21 NG/ML (ref ?–4)

## 2018-10-23 PROCEDURE — 84153 ASSAY OF PSA TOTAL: CPT

## 2018-11-06 ENCOUNTER — HOSPITAL ENCOUNTER (OUTPATIENT)
Dept: HOSPITAL 53 - M OPP | Age: 68
Discharge: HOME | End: 2018-11-06
Attending: INTERNAL MEDICINE
Payer: COMMERCIAL

## 2018-11-06 DIAGNOSIS — M19.90: ICD-10-CM

## 2018-11-06 DIAGNOSIS — D12.3: ICD-10-CM

## 2018-11-06 DIAGNOSIS — Z79.82: ICD-10-CM

## 2018-11-06 DIAGNOSIS — K21.9: ICD-10-CM

## 2018-11-06 DIAGNOSIS — F17.220: ICD-10-CM

## 2018-11-06 DIAGNOSIS — D12.2: ICD-10-CM

## 2018-11-06 DIAGNOSIS — M85.89: ICD-10-CM

## 2018-11-06 DIAGNOSIS — Z98.890: ICD-10-CM

## 2018-11-06 DIAGNOSIS — F33.9: ICD-10-CM

## 2018-11-06 DIAGNOSIS — Z12.11: Primary | ICD-10-CM

## 2018-11-06 DIAGNOSIS — F17.210: ICD-10-CM

## 2018-11-06 DIAGNOSIS — E78.70: ICD-10-CM

## 2018-11-06 DIAGNOSIS — Z85.46: ICD-10-CM

## 2018-11-06 DIAGNOSIS — D12.5: ICD-10-CM

## 2018-11-06 DIAGNOSIS — Z88.1: ICD-10-CM

## 2018-11-06 DIAGNOSIS — Z88.8: ICD-10-CM

## 2018-11-06 DIAGNOSIS — Z79.899: ICD-10-CM

## 2018-11-06 DIAGNOSIS — I10: ICD-10-CM

## 2018-11-06 PROCEDURE — 45385 COLONOSCOPY W/LESION REMOVAL: CPT

## 2018-11-14 ENCOUNTER — HOSPITAL ENCOUNTER (OUTPATIENT)
Dept: HOSPITAL 53 - M RAD | Age: 68
End: 2018-11-14
Attending: OTOLARYNGOLOGY
Payer: COMMERCIAL

## 2018-11-14 ENCOUNTER — HOSPITAL ENCOUNTER (OUTPATIENT)
Dept: HOSPITAL 53 - M LAB | Age: 68
End: 2018-11-14
Attending: OTOLARYNGOLOGY
Payer: COMMERCIAL
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
## 2018-11-14 DIAGNOSIS — Z08: Primary | ICD-10-CM

## 2018-11-14 DIAGNOSIS — R22.1: Primary | ICD-10-CM

## 2018-11-14 LAB
BLOOD UREA NITROGEN: 11 MG/DL (ref 7–18)
CREATININE FOR GFR: 0.87 MG/DL (ref 0.7–1.3)
GFR SERPL CREATININE-BSD FRML MDRD: > 60 ML/MIN/{1.73_M2} (ref 42–?)

## 2018-11-14 PROCEDURE — 82565 ASSAY OF CREATININE: CPT

## 2018-11-26 ENCOUNTER — HOSPITAL ENCOUNTER (OUTPATIENT)
Dept: HOSPITAL 53 - M RAD | Age: 68
End: 2018-11-26
Attending: OTOLARYNGOLOGY
Payer: COMMERCIAL

## 2018-11-26 DIAGNOSIS — R22.1: Primary | ICD-10-CM

## 2018-12-27 ENCOUNTER — HOSPITAL ENCOUNTER (OUTPATIENT)
Dept: HOSPITAL 53 - M SMT | Age: 68
End: 2018-12-27
Attending: UROLOGY
Payer: COMMERCIAL

## 2018-12-27 DIAGNOSIS — C61: Primary | ICD-10-CM

## 2018-12-27 PROCEDURE — 36415 COLL VENOUS BLD VENIPUNCTURE: CPT

## 2018-12-27 PROCEDURE — 84153 ASSAY OF PSA TOTAL: CPT

## 2019-01-23 ENCOUNTER — HOSPITAL ENCOUNTER (OUTPATIENT)
Dept: HOSPITAL 53 - M RAD | Age: 69
End: 2019-01-23
Attending: FAMILY MEDICINE
Payer: MEDICARE

## 2019-01-23 ENCOUNTER — HOSPITAL ENCOUNTER (OUTPATIENT)
Dept: HOSPITAL 53 - M SMT | Age: 69
End: 2019-01-23
Attending: NURSE PRACTITIONER
Payer: COMMERCIAL

## 2019-01-23 DIAGNOSIS — Z12.12: Primary | ICD-10-CM

## 2019-01-23 DIAGNOSIS — C61: ICD-10-CM

## 2019-01-23 DIAGNOSIS — Z87.891: ICD-10-CM

## 2019-01-23 DIAGNOSIS — C61: Primary | ICD-10-CM

## 2019-01-23 PROCEDURE — 87086 URINE CULTURE/COLONY COUNT: CPT

## 2019-01-24 NOTE — REP
Clinical:  Lung screening.  History nicotine dependence

 

Comparison:  12/20/2017

 

Technique:  Axial low-dose noncontrast images from the thoracic inlet to the

upper abdomen using lung screening technique.

 

Findings:

The lung fields are well-aerated. Scattered chronic changes remain stable.  No

consolidation, significant nodule or mass lesion is appreciated.  No pleural

effusion/reaction or pneumothorax.  Tracheobronchial tree is patent.  Mediastinum

demonstrates atherosclerotic changes of the coronary arteries without

cardiomegaly.

 

Impression:

1.  Lung-RADS category I.  No nodule or suspicious abnormality.

2.  Chronic stable changes are identified and management recommendations include

annual low-dose CT evaluation.

 

 

Electronically Signed by

Robert Chauhan MD 01/24/2019 07:49 A

## 2019-02-19 ENCOUNTER — HOSPITAL ENCOUNTER (OUTPATIENT)
Dept: HOSPITAL 53 - M OPP | Age: 69
Discharge: HOME | End: 2019-02-19
Attending: INTERNAL MEDICINE
Payer: MEDICARE

## 2019-02-19 VITALS — WEIGHT: 173 LBS | BODY MASS INDEX: 25.62 KG/M2 | HEIGHT: 69 IN

## 2019-02-19 VITALS — SYSTOLIC BLOOD PRESSURE: 120 MMHG | DIASTOLIC BLOOD PRESSURE: 60 MMHG

## 2019-02-19 DIAGNOSIS — Z85.46: ICD-10-CM

## 2019-02-19 DIAGNOSIS — Z86.010: Primary | ICD-10-CM

## 2019-02-19 DIAGNOSIS — D12.3: ICD-10-CM

## 2019-02-19 DIAGNOSIS — F17.210: ICD-10-CM

## 2019-02-19 DIAGNOSIS — K21.9: ICD-10-CM

## 2019-02-19 DIAGNOSIS — Z88.8: ICD-10-CM

## 2019-02-19 DIAGNOSIS — Z79.899: ICD-10-CM

## 2019-02-19 DIAGNOSIS — K57.30: ICD-10-CM

## 2019-02-19 DIAGNOSIS — D12.2: ICD-10-CM

## 2019-02-19 DIAGNOSIS — Z79.82: ICD-10-CM

## 2019-02-19 NOTE — ROOR
________________________________________________________________________________

Patient Name: Francesco Castañeda            Procedure Date: 2/19/2019 10:14 AM

MRN: [id number]                          Account Number: [id number]

YOB: 1950                Age: 68

Room: [facility]                            Gender: Male

Note Status: Finalized                 

________________________________________________________________________________

 

Procedure:           Colonoscopy

Indications:         Therapeutic procedure for known colon adenoma

Providers:           Eleno KLEIN MD

Referring MD:        Brody Goodman MD

Requesting Provider: 

Medicines:           Monitored Anesthesia Care

Complications:       No immediate complications.

________________________________________________________________________________

Procedure:           Pre-Anesthesia Assessment:

                     - The heart rate, respiratory rate, oxygen saturations, 

                     blood pressure, adequacy of pulmonary ventilation, and 

                     response to care were monitored throughout the procedure.

                     The Colonoscope was introduced through the anus and 

                     advanced to the cecum, identified by appendiceal orifice 

                     and ileocecal valve. The colonoscopy was technically 

                     difficult and complex. Successful completion of the 

                     procedure was aided by changing the patient to a supine 

                     position.

                                                                                

Findings:

     The perianal and digital rectal examinations were normal.

     A tattoo was seen in the proximal ascending colon. A post-polypectomy 

     scar with residual polyp tissue was found at the tattoo site.

     A 15 mm residual polyp was found in the proximal ascending colon (at the 

     tattoo site). The polyp was multi-lobulated. The polyp was removed with a 

     piecemeal technique using a hot snare. Resection and retrieval were 

     complete.

     A 7 mm polyp was found in the splenic flexure. The polyp was sessile. The 

     polyp was removed with a hot snare. Resection and retrieval were complete.

     The exam was otherwise without abnormality on direct and retroflexion 

     views.

                                                                                

Impression:          - A tattoo was seen in the proximal ascending colon. A 

                     post-polypectomy scar was found at the tattoo site.

                     - One 15 mm residual polyp in the proximal ascending 

                     colon (in the area of the tattoo), removed piecemeal 

                     using a hot snare. Resected and retrieved.

                     - One 7 mm polyp at the splenic flexure, removed with a 

                     hot snare. Resected and retrieved.

                     - The examination was otherwise normal on direct and 

                     retroflexion views.

Recommendation:      - Repeat colonoscopy in 2 years for surveillance after 

                     piecemeal polypectomy.

                     - (FYI: **Location of this residual polyp was close to 

                     the ICV, under a fold. Access here is quite difficult, 

                     and it is only accessible in retroflexed position in 

                     cecum, with patient in supine position).

                                                                                

 

Eleno Klein MD

________________

Eleno KLEIN MD

2/19/2019 11:14:40 AM

This report has been signed electronically.

Number of Addenda: 0

 

Note Initiated On: 2/19/2019 10:14 AM

Estimated Blood Loss:

     Estimated blood loss: none.

## 2019-03-18 ENCOUNTER — HOSPITAL ENCOUNTER (EMERGENCY)
Dept: HOSPITAL 53 - M ED | Age: 69
Discharge: HOME | End: 2019-03-18
Payer: MEDICARE

## 2019-03-18 VITALS — DIASTOLIC BLOOD PRESSURE: 55 MMHG | SYSTOLIC BLOOD PRESSURE: 112 MMHG

## 2019-03-18 VITALS — BODY MASS INDEX: 24.42 KG/M2 | WEIGHT: 164.91 LBS | HEIGHT: 69 IN

## 2019-03-18 DIAGNOSIS — D75.9: ICD-10-CM

## 2019-03-18 DIAGNOSIS — Z79.82: ICD-10-CM

## 2019-03-18 DIAGNOSIS — R51: ICD-10-CM

## 2019-03-18 DIAGNOSIS — Z88.1: ICD-10-CM

## 2019-03-18 DIAGNOSIS — Z72.0: ICD-10-CM

## 2019-03-18 DIAGNOSIS — Z88.8: ICD-10-CM

## 2019-03-18 DIAGNOSIS — K04.7: Primary | ICD-10-CM

## 2019-03-18 DIAGNOSIS — Z79.899: ICD-10-CM

## 2019-03-18 DIAGNOSIS — J06.9: ICD-10-CM

## 2019-03-18 DIAGNOSIS — K21.9: ICD-10-CM

## 2019-03-18 DIAGNOSIS — E78.00: ICD-10-CM

## 2019-03-18 DIAGNOSIS — Z85.46: ICD-10-CM

## 2019-03-18 DIAGNOSIS — I10: ICD-10-CM

## 2019-03-18 LAB
BASOPHILS # BLD AUTO: 0.1 10^3/UL (ref 0–0.2)
BASOPHILS NFR BLD AUTO: 0.4 % (ref 0–1)
BUN SERPL-MCNC: 10 MG/DL (ref 7–18)
CALCIUM SERPL-MCNC: 9.2 MG/DL (ref 8.8–10.2)
CHLORIDE SERPL-SCNC: 96 MEQ/L (ref 98–107)
CO2 SERPL-SCNC: 29 MEQ/L (ref 21–32)
CREAT SERPL-MCNC: 0.98 MG/DL (ref 0.7–1.3)
CRP SERPL-MCNC: 10.4 MG/DL (ref 0–0.3)
EOSINOPHIL # BLD AUTO: 0.2 10^3/UL (ref 0–0.5)
EOSINOPHIL NFR BLD AUTO: 1.7 % (ref 0–3)
FLUAV RNA UPPER RESP QL NAA+PROBE: NEGATIVE
FLUBV RNA UPPER RESP QL NAA+PROBE: NEGATIVE
GFR SERPL CREATININE-BSD FRML MDRD: > 60 ML/MIN/{1.73_M2} (ref 42–?)
GLUCOSE SERPL-MCNC: 98 MG/DL (ref 70–100)
HCT VFR BLD AUTO: 40.8 % (ref 42–52)
HGB BLD-MCNC: 14.2 G/DL (ref 13.5–17.5)
LYMPHOCYTES # BLD AUTO: 1.6 10^3/UL (ref 1.5–4.5)
LYMPHOCYTES NFR BLD AUTO: 12.2 % (ref 24–44)
MCH RBC QN AUTO: 31.3 PG (ref 27–33)
MCHC RBC AUTO-ENTMCNC: 34.8 G/DL (ref 32–36.5)
MCV RBC AUTO: 90.1 FL (ref 80–96)
MONOCYTES # BLD AUTO: 1.1 10^3/UL (ref 0–0.8)
MONOCYTES NFR BLD AUTO: 8.3 % (ref 0–5)
NEUTROPHILS # BLD AUTO: 10.3 10^3/UL (ref 1.8–7.7)
NEUTROPHILS NFR BLD AUTO: 76.7 % (ref 36–66)
PLATELET # BLD AUTO: 471 10^3/UL (ref 150–450)
POTASSIUM SERPL-SCNC: 3.1 MEQ/L (ref 3.5–5.1)
RBC # BLD AUTO: 4.53 10^6/UL (ref 4.3–6.1)
SODIUM SERPL-SCNC: 133 MEQ/L (ref 136–145)
WBC # BLD AUTO: 13.4 10^3/UL (ref 4–10)

## 2019-03-18 PROCEDURE — 85025 COMPLETE CBC W/AUTO DIFF WBC: CPT

## 2019-03-18 PROCEDURE — 96375 TX/PRO/DX INJ NEW DRUG ADDON: CPT

## 2019-03-18 PROCEDURE — 86140 C-REACTIVE PROTEIN: CPT

## 2019-03-18 PROCEDURE — 71046 X-RAY EXAM CHEST 2 VIEWS: CPT

## 2019-03-18 PROCEDURE — 80048 BASIC METABOLIC PNL TOTAL CA: CPT

## 2019-03-18 PROCEDURE — 99283 EMERGENCY DEPT VISIT LOW MDM: CPT

## 2019-03-18 PROCEDURE — 87502 INFLUENZA DNA AMP PROBE: CPT

## 2019-03-18 PROCEDURE — 96365 THER/PROPH/DIAG IV INF INIT: CPT

## 2019-03-18 NOTE — REP
Chest x-ray:  Two views.

 

History:  Cough shortness of breath.  Tobacco use.

 

Comparison study:  February 6, 2017.

 

Findings:  The lungs are well inflated and free of infiltrate.  Pleural angles

are sharp.  Heart size is normal.  Pulmonary vasculature is not increased.  No

significant bony abnormality is seen.

 

Impression:

 

No active disease.

 

 

Electronically Signed by

Roly Leal MD 03/18/2019 07:22 P

## 2019-03-20 ENCOUNTER — HOSPITAL ENCOUNTER (OUTPATIENT)
Dept: HOSPITAL 53 - M SFHCPLAZ | Age: 69
End: 2019-03-20
Attending: FAMILY MEDICINE
Payer: MEDICARE

## 2019-03-20 DIAGNOSIS — E87.1: ICD-10-CM

## 2019-03-20 DIAGNOSIS — R63.4: Primary | ICD-10-CM

## 2019-03-20 DIAGNOSIS — E87.6: ICD-10-CM

## 2019-03-20 DIAGNOSIS — R07.1: ICD-10-CM

## 2019-03-20 LAB
ALBUMIN SERPL BCG-MCNC: 3.3 GM/DL (ref 3.2–5.2)
BASOPHILS # BLD AUTO: 0.1 10^3/UL (ref 0–0.2)
BASOPHILS NFR BLD AUTO: 0.7 % (ref 0–1)
BUN SERPL-MCNC: 11 MG/DL (ref 7–18)
CALCIUM SERPL-MCNC: 9.6 MG/DL (ref 8.8–10.2)
CHLORIDE SERPL-SCNC: 94 MEQ/L (ref 98–107)
CO2 SERPL-SCNC: 32 MEQ/L (ref 21–32)
CREAT SERPL-MCNC: 0.92 MG/DL (ref 0.7–1.3)
CRP SERPL-MCNC: 6.51 MG/DL (ref 0–0.3)
EOSINOPHIL # BLD AUTO: 0.4 10^3/UL (ref 0–0.5)
EOSINOPHIL NFR BLD AUTO: 4.1 % (ref 0–3)
GFR SERPL CREATININE-BSD FRML MDRD: > 60 ML/MIN/{1.73_M2} (ref 42–?)
GLUCOSE SERPL-MCNC: 79 MG/DL (ref 70–100)
HCT VFR BLD AUTO: 41.6 % (ref 42–52)
HGB BLD-MCNC: 14 G/DL (ref 13.5–17.5)
LYMPHOCYTES # BLD AUTO: 1.6 10^3/UL (ref 1.5–4.5)
LYMPHOCYTES NFR BLD AUTO: 16.5 % (ref 24–44)
MCH RBC QN AUTO: 31.4 PG (ref 27–33)
MCHC RBC AUTO-ENTMCNC: 33.7 G/DL (ref 32–36.5)
MCV RBC AUTO: 93.3 FL (ref 80–96)
MONOCYTES # BLD AUTO: 0.8 10^3/UL (ref 0–0.8)
MONOCYTES NFR BLD AUTO: 7.7 % (ref 0–5)
NEUTROPHILS # BLD AUTO: 6.9 10^3/UL (ref 1.8–7.7)
NEUTROPHILS NFR BLD AUTO: 69.9 % (ref 36–66)
PLATELET # BLD AUTO: 519 10^3/UL (ref 150–450)
POTASSIUM SERPL-SCNC: 3.5 MEQ/L (ref 3.5–5.1)
PREALB SERPL-MCNC: 18 MG/DL (ref 20–40)
RBC # BLD AUTO: 4.46 10^6/UL (ref 4.3–6.1)
SODIUM SERPL-SCNC: 135 MEQ/L (ref 136–145)
WBC # BLD AUTO: 9.9 10^3/UL (ref 4–10)

## 2019-03-20 PROCEDURE — 84134 ASSAY OF PREALBUMIN: CPT

## 2019-03-20 PROCEDURE — 85025 COMPLETE CBC W/AUTO DIFF WBC: CPT

## 2019-03-20 PROCEDURE — 82040 ASSAY OF SERUM ALBUMIN: CPT

## 2019-03-20 PROCEDURE — 93005 ELECTROCARDIOGRAM TRACING: CPT

## 2019-03-20 PROCEDURE — 80048 BASIC METABOLIC PNL TOTAL CA: CPT

## 2019-03-20 PROCEDURE — 36415 COLL VENOUS BLD VENIPUNCTURE: CPT

## 2019-03-20 PROCEDURE — 86140 C-REACTIVE PROTEIN: CPT

## 2019-03-20 PROCEDURE — 94010 BREATHING CAPACITY TEST: CPT

## 2019-04-09 ENCOUNTER — HOSPITAL ENCOUNTER (OUTPATIENT)
Dept: HOSPITAL 53 - M LAB | Age: 69
End: 2019-04-09
Attending: RADIOLOGY
Payer: MEDICARE

## 2019-04-09 DIAGNOSIS — C61: Primary | ICD-10-CM

## 2019-04-24 ENCOUNTER — HOSPITAL ENCOUNTER (OUTPATIENT)
Dept: HOSPITAL 53 - M ONCR | Age: 69
End: 2019-04-24
Attending: RADIOLOGY
Payer: MEDICARE

## 2019-04-24 DIAGNOSIS — Z92.3: ICD-10-CM

## 2019-04-24 DIAGNOSIS — Z85.46: Primary | ICD-10-CM

## 2019-04-30 NOTE — RADONC
RADIATION ONCOLOGY FOLLOWUP NOTE

 

DATE:  04/24/2019

 

CHART NUMBER:  

 

DIAGNOSIS:  Prostate cancer.

 

STAGE:  II A, [id number]

 

ECOG PERFORMANCE STATUS:  0

 

FOLLOWUP NOTE:

Mr. Castañeda is a very pleasant, 68 year-old white male with the diagnosis of a

stage II A, [id number], moderate to poorly differentiated, Deep score 7 (3-4)

adenocarcinoma of the prostate who is presenting to us today for routine followup

visit 2 years and 4 months post completion of external beam radiation therapy.

 

The patient presents today reporting that he is doing quite well with no

complaints at this time related to his radiation therapy or disease.  He has no

urinary or bowel difficulties.  No bone pain.

 

REVIEW OF SYSTEMS:

The patient's review of systems is noncontributory.  Denies nausea, vomiting,

fevers, chills, night sweats, diplopia, headaches, anxiety or depression,

anorexia, weight loss, visual disturbances, chest pain, urinary or bowel

difficulties, bone pain, or neurological problems.

 

PHYSICAL EXAMINATION:

The patient is a well-developed, well-nourished male in no acute distress.

HEENT exam is normocephalic, atraumatic.  Extraocular movements are intact.

There is no palpable cervical, supraclavicular, infraclavicular, axillary, or

inguinal lymphadenopathy present.

Lungs are clear to auscultation and percussion.

Heart has a regular rate and rhythm.

Abdomen is benign with no hepatosplenomegaly, masses, or tenderness.

Rectal examination reveals a normal anal sphincter tone.  The patient's prostate

bed is smooth with no evidence of nodularity or recurrent disease.

Skeletal examination reveals no tenderness to pressure or percussion of the bony

skeleton.

Extremities reveal no clubbing, cyanosis, or edema.

Neurologic exam is grossly intact, as is the remainder of the physical

examination.

 

ASSESSMENT:

The patient is clinically CARMEN at this time and will be seen by us again in 6

months for further followup.  He will also continue to be followed by his other

physicians as well.

 

 

 

 

cc:    MD Donavon Coburn PA-C

## 2019-06-18 ENCOUNTER — HOSPITAL ENCOUNTER (OUTPATIENT)
Dept: HOSPITAL 53 - M LAB | Age: 69
End: 2019-06-18
Attending: NURSE PRACTITIONER
Payer: MEDICARE

## 2019-06-18 DIAGNOSIS — C61: Primary | ICD-10-CM

## 2019-07-19 ENCOUNTER — HOSPITAL ENCOUNTER (OUTPATIENT)
Dept: HOSPITAL 53 - M SMT | Age: 69
End: 2019-07-19
Attending: UROLOGY
Payer: MEDICARE

## 2019-07-19 ENCOUNTER — HOSPITAL ENCOUNTER (OUTPATIENT)
Dept: HOSPITAL 53 - M LAB | Age: 69
End: 2019-07-19
Attending: RADIOLOGY
Payer: MEDICARE

## 2019-07-19 DIAGNOSIS — C61: Primary | ICD-10-CM

## 2019-07-19 DIAGNOSIS — R31.0: ICD-10-CM

## 2019-07-19 DIAGNOSIS — R31.0: Primary | ICD-10-CM

## 2019-07-19 LAB
APPEARANCE UR: (no result)
BACTERIA UR QL AUTO: NEGATIVE
BILIRUB UR QL STRIP.AUTO: (no result)
GLUCOSE UR QL STRIP.AUTO: NEGATIVE MG/DL
HGB UR QL STRIP.AUTO: (no result)
KETONES UR QL STRIP.AUTO: (no result) MG/DL
LEUKOCYTE ESTERASE UR QL STRIP.AUTO: (no result)
MUCOUS THREADS URNS QL MICRO: (no result)
NITRITE UR QL STRIP.AUTO: NEGATIVE
PH UR STRIP.AUTO: 5 UNITS (ref 5–9)
PROT UR QL STRIP.AUTO: (no result) MG/DL
RBC # UR AUTO: (no result) /HPF (ref 0–3)
SP GR UR STRIP.AUTO: 1.03 (ref 1–1.03)
SQUAMOUS #/AREA URNS AUTO: 6 /HPF (ref 0–6)
UROBILINOGEN UR QL STRIP.AUTO: 2 MG/DL (ref 0–2)
WBC #/AREA URNS AUTO: 18 /HPF (ref 0–3)

## 2019-08-06 ENCOUNTER — HOSPITAL ENCOUNTER (OUTPATIENT)
Dept: HOSPITAL 53 - M LAB | Age: 69
End: 2019-08-06
Attending: FAMILY MEDICINE
Payer: MEDICARE

## 2019-08-06 DIAGNOSIS — F10.10: ICD-10-CM

## 2019-08-06 DIAGNOSIS — E87.1: Primary | ICD-10-CM

## 2019-08-06 DIAGNOSIS — R63.4: ICD-10-CM

## 2019-08-06 DIAGNOSIS — R31.0: ICD-10-CM

## 2019-08-06 LAB
APPEARANCE UR: CLEAR
BACTERIA UR QL AUTO: NEGATIVE
BILIRUB UR QL STRIP.AUTO: NEGATIVE
BUN SERPL-MCNC: 15 MG/DL (ref 7–18)
CALCIUM SERPL-MCNC: 9.8 MG/DL (ref 8.8–10.2)
CHLORIDE SERPL-SCNC: 106 MEQ/L (ref 98–107)
CO2 SERPL-SCNC: 28 MEQ/L (ref 21–32)
CREAT SERPL-MCNC: 0.84 MG/DL (ref 0.7–1.3)
FOLATE SERPL-MCNC: 14 NG/ML (ref 5.4–?)
GFR SERPL CREATININE-BSD FRML MDRD: > 60 ML/MIN/{1.73_M2} (ref 42–?)
GLUCOSE SERPL-MCNC: 109 MG/DL (ref 70–100)
GLUCOSE UR QL STRIP.AUTO: NEGATIVE MG/DL
HGB UR QL STRIP.AUTO: NEGATIVE
KETONES UR QL STRIP.AUTO: NEGATIVE MG/DL
LEUKOCYTE ESTERASE UR QL STRIP.AUTO: NEGATIVE
NITRITE UR QL STRIP.AUTO: NEGATIVE
PH UR STRIP.AUTO: 7 UNITS (ref 5–9)
POTASSIUM SERPL-SCNC: 3.6 MEQ/L (ref 3.5–5.1)
PREALB SERPL-MCNC: 32.5 MG/DL (ref 20–40)
PROT UR QL STRIP.AUTO: NEGATIVE MG/DL
RBC # UR AUTO: 5 /HPF (ref 0–3)
SODIUM SERPL-SCNC: 142 MEQ/L (ref 136–145)
SP GR UR STRIP.AUTO: 1.02 (ref 1–1.03)
SQUAMOUS #/AREA URNS AUTO: 0 /HPF (ref 0–6)
UROBILINOGEN UR QL STRIP.AUTO: 0.2 MG/DL (ref 0–2)
VIT B12 SERPL-MCNC: 338 PG/ML (ref 247–911)
WBC #/AREA URNS AUTO: 0 /HPF (ref 0–3)

## 2019-09-12 ENCOUNTER — HOSPITAL ENCOUNTER (OUTPATIENT)
Dept: HOSPITAL 53 - M SMT | Age: 69
End: 2019-09-12
Attending: NURSE PRACTITIONER
Payer: MEDICARE

## 2019-09-12 DIAGNOSIS — R31.0: Primary | ICD-10-CM

## 2019-09-12 LAB
APPEARANCE UR: CLEAR
BACTERIA UR QL AUTO: NEGATIVE
BILIRUB UR QL STRIP.AUTO: NEGATIVE
GLUCOSE UR QL STRIP.AUTO: NEGATIVE MG/DL
HGB UR QL STRIP.AUTO: (no result)
KETONES UR QL STRIP.AUTO: NEGATIVE MG/DL
LEUKOCYTE ESTERASE UR QL STRIP.AUTO: NEGATIVE
MUCOUS THREADS URNS QL MICRO: (no result)
NITRITE UR QL STRIP.AUTO: NEGATIVE
PH UR STRIP.AUTO: 6 UNITS (ref 5–9)
PROT UR QL STRIP.AUTO: NEGATIVE MG/DL
RBC # UR AUTO: 1 /HPF (ref 0–3)
SP GR UR STRIP.AUTO: 1.01 (ref 1–1.03)
SQUAMOUS #/AREA URNS AUTO: 1 /HPF (ref 0–6)
UROBILINOGEN UR QL STRIP.AUTO: 0.2 MG/DL (ref 0–2)
WBC #/AREA URNS AUTO: 1 /HPF (ref 0–3)

## 2019-09-12 PROCEDURE — 81001 URINALYSIS AUTO W/SCOPE: CPT

## 2019-09-12 PROCEDURE — 87086 URINE CULTURE/COLONY COUNT: CPT

## 2019-09-12 PROCEDURE — 88108 CYTOPATH CONCENTRATE TECH: CPT

## 2019-09-17 ENCOUNTER — HOSPITAL ENCOUNTER (OUTPATIENT)
Dept: HOSPITAL 53 - M LAB | Age: 69
End: 2019-09-17
Attending: NURSE PRACTITIONER
Payer: MEDICARE

## 2019-09-17 DIAGNOSIS — R31.0: Primary | ICD-10-CM

## 2019-09-17 LAB
BUN SERPL-MCNC: 11 MG/DL (ref 7–18)
CALCIUM SERPL-MCNC: 9.6 MG/DL (ref 8.8–10.2)
CHLORIDE SERPL-SCNC: 104 MEQ/L (ref 98–107)
CO2 SERPL-SCNC: 26 MEQ/L (ref 21–32)
CREAT SERPL-MCNC: 0.91 MG/DL (ref 0.7–1.3)
GFR SERPL CREATININE-BSD FRML MDRD: > 60 ML/MIN/{1.73_M2} (ref 42–?)
GLUCOSE SERPL-MCNC: 100 MG/DL (ref 70–100)
POTASSIUM SERPL-SCNC: 3.8 MEQ/L (ref 3.5–5.1)
SODIUM SERPL-SCNC: 140 MEQ/L (ref 136–145)

## 2019-09-18 ENCOUNTER — HOSPITAL ENCOUNTER (OUTPATIENT)
Dept: HOSPITAL 53 - M RAD | Age: 69
End: 2019-09-18
Attending: NURSE PRACTITIONER
Payer: MEDICARE

## 2019-09-18 DIAGNOSIS — R31.0: Primary | ICD-10-CM

## 2019-09-18 PROCEDURE — 74178 CT ABD&PLV WO CNTR FLWD CNTR: CPT

## 2019-09-19 NOTE — REP
Clinical:  Gross hematuria.

 

Technique:  Axial precontrast, contrast enhanced, and delayed images of the

abdomen and pelvis using 100 ml Isovue 370 intravenous contrast material with

coronal and sagittal re-formations as well as 3-D MIP urogram.

 

Findings:

The kidneys demonstrate moderate chronic-appearing perinephric stranding along

with two simple subcentimeter right renal cysts as well as multiple simple left

renal cysts measuring up to 3.3 cm diameter.  Renovascular calcifications are

identified without evidence for hydronephrosis, intrarenal, or obstructing

ureteral calculi.

 

Liver, spleen, pancreas, gallbladder, and bilateral adrenal glands are normal.

The enteric system is without obstruction or acute inflammatory process.  Normal

terminal ileum and appendix are identified in the right lower quadrant.

Scattered diverticula noted without acute diverticulitis.  Pelvis demonstrates

collapsed bladder and surgical clips suggesting prior prostatectomy as well as

evidence of prior right hip fracture.  No acute fracture dislocation.  Abdominal

aorta demonstrates atherosclerotic changes without aneurysm or dissection.  No

ascites.  No free air.  No adenopathy.

 

Impression:

1.  Evaluation of the urinary tract system demonstrates chronic renovascular

calcifications, simple appearing cysts (left greater than right), and chronic

perinephric stranding without hydronephrosis or nephroureterolithiasis.

 

 

Electronically Signed by

Robert Chauhan MD 09/19/2019 07:55 A

## 2019-10-22 ENCOUNTER — HOSPITAL ENCOUNTER (OUTPATIENT)
Dept: HOSPITAL 53 - M LAB | Age: 69
End: 2019-10-22
Attending: RADIOLOGY
Payer: MEDICARE

## 2019-10-22 DIAGNOSIS — C61: Primary | ICD-10-CM

## 2019-10-23 ENCOUNTER — HOSPITAL ENCOUNTER (OUTPATIENT)
Dept: HOSPITAL 53 - M ONCR | Age: 69
End: 2019-10-23
Attending: RADIOLOGY
Payer: MEDICARE

## 2019-10-23 DIAGNOSIS — Z85.46: Primary | ICD-10-CM

## 2019-10-23 DIAGNOSIS — Z92.3: ICD-10-CM

## 2019-10-24 ENCOUNTER — HOSPITAL ENCOUNTER (OUTPATIENT)
Dept: HOSPITAL 53 - M SFHCPLAZ | Age: 69
End: 2019-10-24
Attending: DERMATOLOGY
Payer: MEDICARE

## 2019-10-24 DIAGNOSIS — D22.72: Primary | ICD-10-CM

## 2019-10-24 PROCEDURE — 17000 DESTRUCT PREMALG LESION: CPT

## 2019-10-24 PROCEDURE — 88305 TISSUE EXAM BY PATHOLOGIST: CPT

## 2019-10-24 PROCEDURE — 11102 TANGNTL BX SKIN SINGLE LES: CPT

## 2019-10-24 NOTE — RADONC
RADIATION ONCOLOGY FOLLOWUP NOTE

 

DATE:  10/23/2019

 

DIAGNOSIS:

Prostate cancer.

 

STAGE:

Stage II A, [id number] status post robotic prostatectomy.

 

ECOG performance status 1.

 

Mr. Castañeda is a 69-year-old man with a diagnosis of prostate cancer, moderately 
to

poorly differentiated, who underwent external beam following robotic

prostatectomy.  His PSA most recently was 0.20 down from 0.22.  He has seen Dr. Franklin with some episodes of hematuria and according to the patient cystoscopy

was performed, which showed some surgical clips extending into the bladder.

These clips apparently were the source of the hematuria.

 

REVIEW OF SYSTEMS:  The patient denies any nausea, vomiting, diarrhea, dysuria 
or

blood per rectum.  He does have hematuria, as explained above.

 

Otherwise, he continues to do quite well with no specific restrictions.

 

EXAMINATION FINDINGS:  The skin within the irradiated volume looks normal.  
There

is no palpable peripheral lymphadenopathy.  Lungs are clear.  Heart regular.

Abdomen without evidence of hepatomegaly, masses, deep abdominal tenderness.

Extremities without cyanosis, clubbing or edema.  The remainder of the physical

examination is unchanged.

 

IMPRESSION:

Clinically CARMEN.  We would like the patient to return on a p.r.n. basis, and he

was advised to continue seeing his referring physicians as per their directions

and instructions.

 

 

 

 

cc:    MD Donavon Coburn PA-C MTDD

## 2019-10-30 ENCOUNTER — HOSPITAL ENCOUNTER (OUTPATIENT)
Dept: HOSPITAL 53 - M LAB | Age: 69
End: 2019-10-30
Attending: UROLOGY
Payer: MEDICARE

## 2019-10-30 DIAGNOSIS — Z01.818: Primary | ICD-10-CM

## 2019-10-30 DIAGNOSIS — T19.1XXA: ICD-10-CM
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
## 2019-10-30 LAB
HCT VFR BLD AUTO: 43 % (ref 42–52)
HGB BLD-MCNC: 14.3 G/DL (ref 13.5–17.5)
MCH RBC QN AUTO: 32.6 PG (ref 27–33)
MCHC RBC AUTO-ENTMCNC: 33.3 G/DL (ref 32–36.5)
MCV RBC AUTO: 97.9 FL (ref 80–96)
PLATELET # BLD AUTO: 273 10^3/UL (ref 150–450)
RBC # BLD AUTO: 4.39 10^6/UL (ref 4.3–6.1)
WBC # BLD AUTO: 8.2 10^3/UL (ref 4–10)

## 2019-11-27 ENCOUNTER — HOSPITAL ENCOUNTER (OUTPATIENT)
Dept: HOSPITAL 53 - M SMT | Age: 69
End: 2019-11-27
Attending: UROLOGY
Payer: MEDICARE

## 2019-11-27 DIAGNOSIS — Z01.818: Primary | ICD-10-CM

## 2019-11-27 DIAGNOSIS — T19.1XXA: ICD-10-CM

## 2019-11-27 DIAGNOSIS — N39.0: ICD-10-CM

## 2019-12-06 ENCOUNTER — HOSPITAL ENCOUNTER (OUTPATIENT)
Dept: HOSPITAL 53 - M SDC | Age: 69
Discharge: HOME | End: 2019-12-06
Attending: UROLOGY
Payer: MEDICARE

## 2019-12-06 VITALS — HEIGHT: 69 IN | WEIGHT: 178.8 LBS | BODY MASS INDEX: 26.48 KG/M2

## 2019-12-06 VITALS — DIASTOLIC BLOOD PRESSURE: 68 MMHG | SYSTOLIC BLOOD PRESSURE: 146 MMHG
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
## 2019-12-06 DIAGNOSIS — K57.32: ICD-10-CM

## 2019-12-06 DIAGNOSIS — Z92.3: ICD-10-CM

## 2019-12-06 DIAGNOSIS — E78.00: ICD-10-CM

## 2019-12-06 DIAGNOSIS — T19.1XXA: ICD-10-CM
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
## 2019-12-06 DIAGNOSIS — I10: ICD-10-CM

## 2019-12-06 DIAGNOSIS — F32.9: ICD-10-CM

## 2019-12-06 DIAGNOSIS — Z79.899: ICD-10-CM

## 2019-12-06 DIAGNOSIS — Z88.1: ICD-10-CM

## 2019-12-06 DIAGNOSIS — Z96.641: ICD-10-CM

## 2019-12-06 DIAGNOSIS — Z88.8: ICD-10-CM

## 2019-12-06 DIAGNOSIS — R06.83: ICD-10-CM

## 2019-12-06 DIAGNOSIS — F12.90: ICD-10-CM

## 2019-12-06 DIAGNOSIS — R31.0: Primary | ICD-10-CM

## 2019-12-06 DIAGNOSIS — Z85.46: ICD-10-CM

## 2019-12-06 DIAGNOSIS — J44.9: ICD-10-CM

## 2019-12-06 DIAGNOSIS — F17.210: ICD-10-CM

## 2019-12-06 PROCEDURE — 52310 CYSTOSCOPY AND TREATMENT: CPT

## 2019-12-06 PROCEDURE — 88300 SURGICAL PATH GROSS: CPT

## 2019-12-08 NOTE — RO
DATE OF PROCEDURE:  12/06/2019

 

PREPROCEDURE DIAGNOSIS:  Bladder foreign bodies.

 

POSTPROCEDURE DIAGNOSIS:  Bladder foreign bodies.

 

PROCEDURE:  Cystoscopy, removal of bladder foreign bodies.

 

SURGEON:  Steven Franklin MD

 

ASSISTANT:  None.

 

ANESTHESIA:  General.

 

OPERATIVE INDICATIONS:  This is a 69-year-old male with a history of prostate

cancer who underwent a robotic radical prostatectomy several years ago.  That 
was

followed radiation therapy.  He recently has been having gross hematuria and on

office cystoscopy it was found to have a few of the Weck clips used during his

robotic surgery had eroded through his

vesicourethral anastomosis and into the bladder.  He was brought to the 
operating

room today to remove those clips.

 

DESCRIPTION OF PROCEDURE:  The patient was brought to the operating room and

general anesthesia was induced.  Prophylactic antibiotics were infused.  He was

then placed in the dorsal lithotomy position and prepped and draped in the usual

sterile fashion.

 

A rigid cystoscope was inserted into the urethral meatus and advanced into the

bladder.  Once inside the bladder, approximately four or five calcified clips

were seen sitting embedded in the tissue of the bladder right at the bladder

neck.  All of these clips were then removed using a stent grasper.  Once all of

the clips were removed, they were washed out of the bladder.  After that was

done, I checked for hemostasis and there was a mild amount of bleeding inside 
the

bladder, but nothing significant.

 

I then removed the cystoscope and inserted an 18 Faroese Constantino catheter into the

bladder.  The balloon was filled with 10 mL of sterile water and then the

catheter was connected to gravity drainage.  This marked the conclusion of the

procedure.

 

The patient was taken out of dorsal lithotomy position, awakened from anesthesia

and transferred to the recovery room in stable condition.

 

ESTIMATED BLOOD LOSS:  5 mL.

 

COMPLICATIONS;  None.

 

SPECIMENS:  Weck clips.

 

PLAN:

The patient will followup in the clinic in approximately one week for a

postoperative visit and to have his catheter removed.

SAAD

## 2020-04-07 ENCOUNTER — HOSPITAL ENCOUNTER (EMERGENCY)
Dept: HOSPITAL 53 - M ED | Age: 70
Discharge: HOME | End: 2020-04-07
Payer: MEDICARE

## 2020-04-07 VITALS — SYSTOLIC BLOOD PRESSURE: 138 MMHG | DIASTOLIC BLOOD PRESSURE: 82 MMHG

## 2020-04-07 VITALS — WEIGHT: 178.57 LBS | HEIGHT: 69 IN | BODY MASS INDEX: 26.45 KG/M2

## 2020-04-07 DIAGNOSIS — Z88.8: ICD-10-CM

## 2020-04-07 DIAGNOSIS — S60.512A: ICD-10-CM

## 2020-04-07 DIAGNOSIS — E78.00: ICD-10-CM

## 2020-04-07 DIAGNOSIS — I10: ICD-10-CM
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
## 2020-04-07 DIAGNOSIS — S00.31XA: ICD-10-CM

## 2020-04-07 DIAGNOSIS — Z79.899: ICD-10-CM

## 2020-04-07 DIAGNOSIS — F33.9: ICD-10-CM

## 2020-04-07 DIAGNOSIS — Z79.82: ICD-10-CM
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
## 2020-04-07 DIAGNOSIS — F10.10: Primary | ICD-10-CM

## 2020-04-07 DIAGNOSIS — J44.9: ICD-10-CM

## 2020-04-07 DIAGNOSIS — Z88.1: ICD-10-CM

## 2020-04-07 DIAGNOSIS — K21.9: ICD-10-CM
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
## 2020-04-07 DIAGNOSIS — S00.11XA: ICD-10-CM

## 2020-04-07 LAB
BASOPHILS # BLD AUTO: 0.1 10^3/UL (ref 0–0.2)
BASOPHILS NFR BLD AUTO: 1.1 % (ref 0–1)
BUN SERPL-MCNC: 11 MG/DL (ref 7–18)
CALCIUM SERPL-MCNC: 9.3 MG/DL (ref 8.8–10.2)
CHLORIDE SERPL-SCNC: 106 MEQ/L (ref 98–107)
CO2 SERPL-SCNC: 30 MEQ/L (ref 21–32)
CREAT SERPL-MCNC: 0.96 MG/DL (ref 0.7–1.3)
EOSINOPHIL # BLD AUTO: 0.3 10^3/UL (ref 0–0.5)
EOSINOPHIL NFR BLD AUTO: 3.8 % (ref 0–3)
ETHANOL SERPL-MCNC: 0.29 % (ref 0–0.01)
GFR SERPL CREATININE-BSD FRML MDRD: > 60 ML/MIN/{1.73_M2} (ref 42–?)
GLUCOSE SERPL-MCNC: 94 MG/DL (ref 70–100)
HCT VFR BLD AUTO: 42.1 % (ref 42–52)
HGB BLD-MCNC: 14.3 G/DL (ref 13.5–17.5)
LYMPHOCYTES # BLD AUTO: 2.1 10^3/UL (ref 1.5–5)
LYMPHOCYTES NFR BLD AUTO: 29.1 % (ref 24–44)
MCH RBC QN AUTO: 32.4 PG (ref 27–33)
MCHC RBC AUTO-ENTMCNC: 34 G/DL (ref 32–36.5)
MCV RBC AUTO: 95.2 FL (ref 80–96)
MONOCYTES # BLD AUTO: 0.7 10^3/UL (ref 0–0.8)
MONOCYTES NFR BLD AUTO: 9.2 % (ref 0–5)
NEUTROPHILS # BLD AUTO: 4 10^3/UL (ref 1.5–8.5)
NEUTROPHILS NFR BLD AUTO: 56.4 % (ref 36–66)
PLATELET # BLD AUTO: 264 10^3/UL (ref 150–450)
POTASSIUM SERPL-SCNC: 3.5 MEQ/L (ref 3.5–5.1)
RBC # BLD AUTO: 4.42 10^6/UL (ref 4.3–6.1)
SODIUM SERPL-SCNC: 141 MEQ/L (ref 136–145)
WBC # BLD AUTO: 7.1 10^3/UL (ref 4–10)

## 2020-04-07 PROCEDURE — 70486 CT MAXILLOFACIAL W/O DYE: CPT

## 2020-04-07 PROCEDURE — 80048 BASIC METABOLIC PNL TOTAL CA: CPT

## 2020-04-07 PROCEDURE — 90715 TDAP VACCINE 7 YRS/> IM: CPT

## 2020-04-07 PROCEDURE — 86850 RBC ANTIBODY SCREEN: CPT

## 2020-04-07 PROCEDURE — 70450 CT HEAD/BRAIN W/O DYE: CPT

## 2020-04-07 PROCEDURE — 85025 COMPLETE CBC W/AUTO DIFF WBC: CPT

## 2020-04-07 PROCEDURE — 86901 BLOOD TYPING SEROLOGIC RH(D): CPT

## 2020-04-07 PROCEDURE — 72125 CT NECK SPINE W/O DYE: CPT

## 2020-04-07 PROCEDURE — 99283 EMERGENCY DEPT VISIT LOW MDM: CPT

## 2020-04-07 PROCEDURE — 86900 BLOOD TYPING SEROLOGIC ABO: CPT

## 2020-04-07 PROCEDURE — 90471 IMMUNIZATION ADMIN: CPT

## 2020-04-07 NOTE — REP
MAXILLOFACIAL CT STUDY WITHOUT CONTRAST:

 

HISTORY:  Injury in a fall.  Trauma to the frontal region.

 

Comparison study is from October 1, 2013.

 

CT FINDINGS:  There is preseptal right periorbital soft tissue swelling.  There

is no evidence of intraconal or extraconal orbital hematoma.  There is some

generalized atrophy visible intracranially.

 

There is polysinusitis with partial opacification of the ethmoid, frontal and

maxillary sinuses.  There is mild mucosal thickening in the sphenoid sinuses.

Mastoid aeration appears normal and symmetric.  Polypoid sinus disease is noted

similar to the findings on the CT study from October 1, 2013 with multiple nasal

polyps and bilateral maxillary sinus polyps.  No additional bony destructive

change.  There is extensive vascular calcification.  Orbital margins are intact.

Zygomatic arches are intact.  No mandibular fracture is appreciated.

 

IMPRESSION:

 

Right pre-septal periorbital soft tissue swelling.  No orbital or maxillary or

mandibular fracture is seen.  Vascular calcification.  Preexisting paranasal

sinus polypoid disease and nasal polypoid disease essentially unchanged from

October 2013.

 

 

Electronically Signed by

Roly Leal MD 04/07/2020 02:51 P

## 2020-04-07 NOTE — REP
CT STUDY OF THE CERVICAL SPINE WITHOUT CONTRAST:

 

HISTORY: Trauma.

 

TECHNIQUE:  Helical scanning is acquired and overlapping 2 mm high resolution

axial images were generated and reviewed at bone and soft tissue window settings.

Coronal and sagittal multiplanar re-formations images are generated.

 

CT FINDINGS:  There is no evidence of cervical spine element fracture.  No skull

base fracture is seen.  Cervical vertebral body heights are preserved.  Alignment

is normal.  Facet joints are normally aligned bilaterally at each cervical level

on multiplanar re-formations images.  There is no evidence of intraspinal or

paraspinal hematoma.  No extra vertebral abnormality is seen.

 

There is a levoconvex curve in the cervical spine on coronal multiplanar

re-formation images.  The patient is rotated somewhat in the scanner.  Vascular

calcification is noted.  There are degenerative disc changes most pronounced at

C5-6 and C6-7 with mild bilateral uncovertebral spurring at these levels.  There

is also degenerative change at the articulation between the dens and the anterior

arch of C1.

 

IMPRESSION:

 

Degenerative spondylosis changes.  Levoconvex curvature.  Vascular calcification.

Otherwise negative CT study of the cervical spine without contrast.  No fracture

seen.

 

 

Electronically Signed by

Roly Leal MD 04/07/2020 02:52 P

## 2020-04-07 NOTE — REP
CT BRAIN WITHOUT CONTRAST:

 

CT brain performed without IV contrast.  There is mild atrophy.  There is mildly

chronic periventricular small vessel ischemic change.  There is a small lacunar

infarct on the left basal ganglia region.  There is no acute intracranial

hemorrhage.  There is no extra-axial fluid collection.  There is no midline shift

or mass effect.  No skull fracture is seen.  There are vascular calcifications in

the carotid siphons.  There are nodular densities in the nasal passages

compatible with multiple polyps.  Retention cyst or polyp is also seen in each

maxillary sinus.

 

IMPRESSION:

 

No evidence of acute intracranial hemorrhage or skull fracture.  Old

periventricular ischemic changes.  Nasal polyps.

 

 

Electronically Signed by

Juan Werner MD 04/07/2020 03:10 P

## 2020-04-10 ENCOUNTER — HOSPITAL ENCOUNTER (OUTPATIENT)
Dept: HOSPITAL 53 - M LAB | Age: 70
End: 2020-04-10
Attending: UROLOGY
Payer: MEDICARE

## 2020-04-10 ENCOUNTER — HOSPITAL ENCOUNTER (OUTPATIENT)
Dept: HOSPITAL 53 - M LAB | Age: 70
End: 2020-04-10
Attending: STUDENT IN AN ORGANIZED HEALTH CARE EDUCATION/TRAINING PROGRAM
Payer: MEDICARE

## 2020-04-10 DIAGNOSIS — F10.10: ICD-10-CM

## 2020-04-10 DIAGNOSIS — Z13.1: ICD-10-CM

## 2020-04-10 DIAGNOSIS — K92.1: ICD-10-CM

## 2020-04-10 DIAGNOSIS — I10: ICD-10-CM

## 2020-04-10 DIAGNOSIS — C61: Primary | ICD-10-CM

## 2020-04-10 DIAGNOSIS — Z13.220: Primary | ICD-10-CM

## 2020-04-10 DIAGNOSIS — Z13.220: ICD-10-CM

## 2020-04-10 LAB
ALBUMIN SERPL BCG-MCNC: 3.9 GM/DL (ref 3.2–5.2)
ALT SERPL W P-5'-P-CCNC: 44 U/L (ref 12–78)
BILIRUB SERPL-MCNC: 1 MG/DL (ref 0.2–1)
BUN SERPL-MCNC: 14 MG/DL (ref 7–18)
CALCIUM SERPL-MCNC: 9.4 MG/DL (ref 8.8–10.2)
CHLORIDE SERPL-SCNC: 102 MEQ/L (ref 98–107)
CHOLEST SERPL-MCNC: 194 MG/DL (ref ?–200)
CHOLEST/HDLC SERPL: 1.92 {RATIO} (ref ?–5)
CO2 SERPL-SCNC: 28 MEQ/L (ref 21–32)
CREAT SERPL-MCNC: 1.17 MG/DL (ref 0.7–1.3)
GFR SERPL CREATININE-BSD FRML MDRD: > 60 ML/MIN/{1.73_M2} (ref 42–?)
GLUCOSE SERPL-MCNC: 132 MG/DL (ref 70–100)
HCT VFR BLD AUTO: 42.5 % (ref 42–52)
HDLC SERPL-MCNC: 101 MG/DL (ref 40–?)
HGB BLD-MCNC: 14.9 G/DL (ref 13.5–17.5)
LDLC SERPL CALC-MCNC: 45 MG/DL (ref ?–100)
MCH RBC QN AUTO: 33.6 PG (ref 27–33)
MCHC RBC AUTO-ENTMCNC: 35.1 G/DL (ref 32–36.5)
MCV RBC AUTO: 95.7 FL (ref 80–96)
NONHDLC SERPL-MCNC: 93 MG/DL
PLATELET # BLD AUTO: 218 10^3/UL (ref 150–450)
POTASSIUM SERPL-SCNC: 3.4 MEQ/L (ref 3.5–5.1)
PROT SERPL-MCNC: 7.2 GM/DL (ref 6.4–8.2)
RBC # BLD AUTO: 4.44 10^6/UL (ref 4.3–6.1)
SODIUM SERPL-SCNC: 139 MEQ/L (ref 136–145)
TRIGL SERPL-MCNC: 241 MG/DL (ref ?–150)
WBC # BLD AUTO: 10.2 10^3/UL (ref 4–10)

## 2020-05-14 ENCOUNTER — HOSPITAL ENCOUNTER (OUTPATIENT)
Dept: HOSPITAL 53 - M LAB | Age: 70
End: 2020-05-14
Attending: STUDENT IN AN ORGANIZED HEALTH CARE EDUCATION/TRAINING PROGRAM
Payer: MEDICARE

## 2020-05-14 DIAGNOSIS — E87.6: Primary | ICD-10-CM

## 2020-05-14 LAB
BUN SERPL-MCNC: 16 MG/DL (ref 7–18)
CALCIUM SERPL-MCNC: 9.3 MG/DL (ref 8.8–10.2)
CHLORIDE SERPL-SCNC: 103 MEQ/L (ref 98–107)
CO2 SERPL-SCNC: 29 MEQ/L (ref 21–32)
CREAT SERPL-MCNC: 0.92 MG/DL (ref 0.7–1.3)
GFR SERPL CREATININE-BSD FRML MDRD: > 60 ML/MIN/{1.73_M2} (ref 42–?)
GLUCOSE SERPL-MCNC: 88 MG/DL (ref 70–100)
POTASSIUM SERPL-SCNC: 3.7 MEQ/L (ref 3.5–5.1)
SODIUM SERPL-SCNC: 139 MEQ/L (ref 136–145)

## 2020-06-22 ENCOUNTER — HOSPITAL ENCOUNTER (OUTPATIENT)
Dept: HOSPITAL 53 - M PLALAB | Age: 70
End: 2020-06-22
Attending: UROLOGY
Payer: MEDICARE

## 2020-06-22 DIAGNOSIS — Z79.899: ICD-10-CM

## 2020-06-22 DIAGNOSIS — Z79.82: ICD-10-CM

## 2020-06-22 DIAGNOSIS — Z79.51: ICD-10-CM

## 2020-06-22 DIAGNOSIS — C61: Primary | ICD-10-CM

## 2020-07-20 ENCOUNTER — HOSPITAL ENCOUNTER (OUTPATIENT)
Dept: HOSPITAL 53 - M SFHCPLAZ | Age: 70
End: 2020-07-20
Attending: FAMILY MEDICINE
Payer: MEDICARE

## 2020-07-20 DIAGNOSIS — Z11.4: Primary | ICD-10-CM

## 2020-07-20 PROCEDURE — 87389 HIV-1 AG W/HIV-1&-2 AB AG IA: CPT

## 2020-07-20 PROCEDURE — 36415 COLL VENOUS BLD VENIPUNCTURE: CPT

## 2020-08-21 ENCOUNTER — HOSPITAL ENCOUNTER (OUTPATIENT)
Dept: HOSPITAL 53 - M PLALAB | Age: 70
End: 2020-08-21
Attending: STUDENT IN AN ORGANIZED HEALTH CARE EDUCATION/TRAINING PROGRAM
Payer: MEDICARE

## 2020-08-21 DIAGNOSIS — E03.0: ICD-10-CM

## 2020-08-21 DIAGNOSIS — R60.0: Primary | ICD-10-CM

## 2020-08-21 LAB
ALBUMIN SERPL BCG-MCNC: 3.6 GM/DL (ref 3.2–5.2)
ALT SERPL W P-5'-P-CCNC: 40 U/L (ref 12–78)
BILIRUB SERPL-MCNC: 0.5 MG/DL (ref 0.2–1)
BUN SERPL-MCNC: 14 MG/DL (ref 7–18)
CALCIUM SERPL-MCNC: 9.7 MG/DL (ref 8.8–10.2)
CHLORIDE SERPL-SCNC: 104 MEQ/L (ref 98–107)
CO2 SERPL-SCNC: 31 MEQ/L (ref 21–32)
CREAT SERPL-MCNC: 1.01 MG/DL (ref 0.7–1.3)
GFR SERPL CREATININE-BSD FRML MDRD: > 60 ML/MIN/{1.73_M2} (ref 42–?)
GLUCOSE SERPL-MCNC: 87 MG/DL (ref 70–100)
NT-PRO BNP: 36 PG/ML (ref ?–125)
POTASSIUM SERPL-SCNC: 4.2 MEQ/L (ref 3.5–5.1)
PROT SERPL-MCNC: 6.7 GM/DL (ref 6.4–8.2)
SODIUM SERPL-SCNC: 137 MEQ/L (ref 136–145)

## 2020-08-24 ENCOUNTER — HOSPITAL ENCOUNTER (EMERGENCY)
Dept: HOSPITAL 53 - M ED | Age: 70
Discharge: HOME | End: 2020-08-24
Payer: MEDICARE

## 2020-08-24 VITALS — SYSTOLIC BLOOD PRESSURE: 142 MMHG | DIASTOLIC BLOOD PRESSURE: 65 MMHG

## 2020-08-24 VITALS — BODY MASS INDEX: 26.02 KG/M2 | WEIGHT: 175.71 LBS | HEIGHT: 69 IN

## 2020-08-24 DIAGNOSIS — Z79.899: ICD-10-CM

## 2020-08-24 DIAGNOSIS — F33.9: ICD-10-CM

## 2020-08-24 DIAGNOSIS — E78.5: ICD-10-CM

## 2020-08-24 DIAGNOSIS — I10: ICD-10-CM

## 2020-08-24 DIAGNOSIS — Z88.8: ICD-10-CM

## 2020-08-24 DIAGNOSIS — K21.9: ICD-10-CM

## 2020-08-24 DIAGNOSIS — F17.210: ICD-10-CM

## 2020-08-24 DIAGNOSIS — F12.20: ICD-10-CM

## 2020-08-24 DIAGNOSIS — M48.07: Primary | ICD-10-CM

## 2020-08-24 DIAGNOSIS — Z88.1: ICD-10-CM

## 2020-08-24 LAB
BASOPHILS # BLD AUTO: 0.1 10^3/UL (ref 0–0.2)
BASOPHILS NFR BLD AUTO: 1.2 % (ref 0–1)
BUN SERPL-MCNC: 15 MG/DL (ref 7–18)
CALCIUM SERPL-MCNC: 9.7 MG/DL (ref 8.8–10.2)
CHLORIDE SERPL-SCNC: 102 MEQ/L (ref 98–107)
CO2 SERPL-SCNC: 27 MEQ/L (ref 21–32)
CREAT SERPL-MCNC: 1.05 MG/DL (ref 0.7–1.3)
EOSINOPHIL # BLD AUTO: 0.2 10^3/UL (ref 0–0.5)
EOSINOPHIL NFR BLD AUTO: 2.4 % (ref 0–3)
GFR SERPL CREATININE-BSD FRML MDRD: > 60 ML/MIN/{1.73_M2} (ref 42–?)
GLUCOSE SERPL-MCNC: 78 MG/DL (ref 70–100)
HCT VFR BLD AUTO: 46.5 % (ref 42–52)
HGB BLD-MCNC: 15.5 G/DL (ref 13.5–17.5)
LYMPHOCYTES # BLD AUTO: 1.5 10^3/UL (ref 1.5–5)
LYMPHOCYTES NFR BLD AUTO: 16.4 % (ref 24–44)
MCH RBC QN AUTO: 32.8 PG (ref 27–33)
MCHC RBC AUTO-ENTMCNC: 33.3 G/DL (ref 32–36.5)
MCV RBC AUTO: 98.5 FL (ref 80–96)
MONOCYTES # BLD AUTO: 0.7 10^3/UL (ref 0–0.8)
MONOCYTES NFR BLD AUTO: 7.5 % (ref 0–5)
NEUTROPHILS # BLD AUTO: 6.6 10^3/UL (ref 1.5–8.5)
NEUTROPHILS NFR BLD AUTO: 72 % (ref 36–66)
PLATELET # BLD AUTO: 319 10^3/UL (ref 150–450)
POTASSIUM SERPL-SCNC: 3.8 MEQ/L (ref 3.5–5.1)
RBC # BLD AUTO: 4.72 10^6/UL (ref 4.3–6.1)
SODIUM SERPL-SCNC: 140 MEQ/L (ref 136–145)
WBC # BLD AUTO: 9.2 10^3/UL (ref 4–10)

## 2020-08-24 NOTE — REPVR
PROCEDURE INFORMATION: 

Exam: CT Lumbar Spine Without Contrast 

Exam date and time: 8/24/2020 9:19 AM 

Age: 70 years old 

Clinical indication: Low back pain; Additional info: Low back pain, h/o 

prostate CA 



TECHNIQUE: 

Imaging protocol: Computed tomography images of the lumbar spine without 

contrast. 

Radiation optimization: All CT scans at this facility use at least one of these 

dose optimization techniques: automated exposure control; mA and/or kV 

adjustment per patient size (includes targeted exams where dose is matched to 

clinical indication); or iterative reconstruction. 



COMPARISON: 

No relevant prior studies available. 



FINDINGS: 

Vertebrae: Mild levoscoliosis of the lumbar spine. 

Discs/Spinal canal/Neural foramina: Degenerative disease and facet hypertrophy. 

Stenosis of the spinal canal at L2-L3 and L3-L4. 

Sacrum/coccyx: Mild degenerative changes bilateral sacroiliac joints. 



Kidneys and ureters: Nonspecific bilateral perinephric fat stranding. 

Vasculature: Atherosclerotic disease. 

Soft tissues: Unremarkable. 



IMPRESSION: 

No acute fractures. Degenerative disease and facet hypertrophy. Stenosis of the 

spinal canal at L2-L3 and L3-L4. 



Electronically signed by: Ruy Burns On 08/24/2020  09:40:02 AM

## 2020-09-28 ENCOUNTER — HOSPITAL ENCOUNTER (OUTPATIENT)
Dept: HOSPITAL 53 - M PLALAB | Age: 70
End: 2020-09-28
Attending: UROLOGY
Payer: MEDICARE

## 2020-09-28 DIAGNOSIS — C61: Primary | ICD-10-CM

## 2020-11-13 ENCOUNTER — HOSPITAL ENCOUNTER (OUTPATIENT)
Dept: HOSPITAL 53 - M PLALAB | Age: 70
End: 2020-11-13
Attending: UROLOGY
Payer: MEDICARE

## 2020-11-13 DIAGNOSIS — C61: Primary | ICD-10-CM

## 2020-12-04 ENCOUNTER — HOSPITAL ENCOUNTER (EMERGENCY)
Dept: HOSPITAL 53 - M ED | Age: 70
Discharge: HOME | End: 2020-12-04
Payer: MEDICARE

## 2020-12-04 VITALS — HEIGHT: 69 IN | WEIGHT: 178.38 LBS | BODY MASS INDEX: 26.42 KG/M2

## 2020-12-04 VITALS — SYSTOLIC BLOOD PRESSURE: 179 MMHG | DIASTOLIC BLOOD PRESSURE: 78 MMHG

## 2020-12-04 DIAGNOSIS — I10: ICD-10-CM

## 2020-12-04 DIAGNOSIS — Z88.1: ICD-10-CM
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
## 2020-12-04 DIAGNOSIS — S82.431A: Primary | ICD-10-CM

## 2020-12-04 DIAGNOSIS — Z79.899: ICD-10-CM

## 2020-12-04 DIAGNOSIS — Z88.8: ICD-10-CM

## 2020-12-04 DIAGNOSIS — J44.9: ICD-10-CM
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
## 2020-12-04 NOTE — REP
INDICATION:

pain and swelling after fall.



COMPARISON:

None.



TECHNIQUE:

Five views.



FINDINGS:

Five views of the right ankle demonstrate an obliquely oriented fracture through the

distal fibular metaphysis at the level of the ankle mortise.  Ankle mortise is intact.

No displacement is seen.  No tibial fracture is seen.  There is some diffuse

osteopenia.



In addition, the silhouette of the Achilles tendon is abnormal with swelling and

irregularity.  There is edema in the pre Achilles fat.  This should be correlated

clinically.  I cannot exclude acute Achilles injury versus chronic Achilles tendinitis

tendinosis change.



IMPRESSION:

1. Nondisplaced obliquely oriented fracture through the distal fibula.

2. Diffuse osteoporosis.

3. Abnormal Achilles tendon with pre Achilles fat edema or infiltration.  Rule out

Achilles rupture versus advanced Achilles tendinopathy.





<Electronically signed by Yasir Leal > 12/04/20 1441

## 2021-01-11 ENCOUNTER — HOSPITAL ENCOUNTER (OUTPATIENT)
Dept: HOSPITAL 53 - M LABSMT | Age: 71
End: 2021-01-11
Attending: UROLOGY
Payer: MEDICARE

## 2021-01-11 DIAGNOSIS — C61: Primary | ICD-10-CM

## 2021-01-11 PROCEDURE — 84153 ASSAY OF PSA TOTAL: CPT

## 2021-01-25 ENCOUNTER — HOSPITAL ENCOUNTER (OUTPATIENT)
Dept: HOSPITAL 53 - M RAD | Age: 71
End: 2021-01-25
Attending: STUDENT IN AN ORGANIZED HEALTH CARE EDUCATION/TRAINING PROGRAM
Payer: MEDICARE

## 2021-01-25 DIAGNOSIS — Z12.2: Primary | ICD-10-CM

## 2021-01-26 NOTE — REP
INDICATION:

ENCNTR SCREEN FOR MALIGNANT NEOPLASM OF RESPIRATORY ORGANS



COMPARISON:

01/23/2019



TECHNIQUE:

Axial noncontrast images from the thoracic inlet to the upper abdomen using low-dose

lung screening technique (LDCT).



FINDINGS:

The bilateral lung fields are well aerated and again demonstrate few scattered chronic

appearing changes including non solid ground-glass density in the basilar right upper

lobe and small adjacent nodule and triangular scarring along the minor fissure and

right middle lobe.



No acute consolidation, significant nodule, or mass.  Tracheobronchial tree is patent.

No effusion.  No pneumothorax.  Atherosclerotic changes to the thoracic aorta and

coronary arteries again noted.  Musculoskeletal structures are intact.



IMPRESSION:

1. Lung-RADS category 1.  Stable chronic scarring again noted.

2. Management recommendations include annual low-dose CT surveillance.





<Electronically signed by Robert Chauhan > 01/26/21 1056

## 2021-04-14 ENCOUNTER — HOSPITAL ENCOUNTER (OUTPATIENT)
Dept: HOSPITAL 53 - M WHC | Age: 71
End: 2021-04-14
Attending: STUDENT IN AN ORGANIZED HEALTH CARE EDUCATION/TRAINING PROGRAM
Payer: MEDICARE

## 2021-04-14 DIAGNOSIS — N63.0: Primary | ICD-10-CM

## 2021-04-14 NOTE — REP
INDICATION:

N63.0 RT BREAST LUMP.



COMPARISON:

None.



TECHNIQUE:

Targeted right breast sonography is performed in the retroareolar region where the

patient describes a palpable lump.  Comparison retroareolar sonography is performed on

the left.



FINDINGS:

Imaging at the site of the palpable lump demonstrates a retroareolar area of

hypoechoic breast tissue suggestive of gynecomastia. Comparison scanning of the left

retroareolar region shows no significant hypoechoic retroareolar rib tissue. No other

sonographic finding is seen in the right breast.



IMPRESSION:

BI-RADS category 0 incomplete.  Probable gynecomastia unilateral right breast.

Consider mammography for further evaluation.





<Electronically signed by Yasir Leal > 04/14/21 [id number]

## 2021-04-25 ENCOUNTER — HOSPITAL ENCOUNTER (OUTPATIENT)
Dept: HOSPITAL 53 - M LABSMTC | Age: 71
End: 2021-04-25
Attending: ANESTHESIOLOGY
Payer: MEDICARE

## 2021-04-25 DIAGNOSIS — Z11.52: ICD-10-CM

## 2021-04-25 DIAGNOSIS — Z01.812: Primary | ICD-10-CM

## 2021-04-30 ENCOUNTER — HOSPITAL ENCOUNTER (OUTPATIENT)
Dept: HOSPITAL 53 - M OPP | Age: 71
Discharge: HOME | End: 2021-04-30
Attending: INTERNAL MEDICINE
Payer: MEDICARE

## 2021-04-30 VITALS — SYSTOLIC BLOOD PRESSURE: 152 MMHG | DIASTOLIC BLOOD PRESSURE: 77 MMHG

## 2021-04-30 VITALS — WEIGHT: 177.1 LBS | HEIGHT: 68 IN | BODY MASS INDEX: 26.84 KG/M2

## 2021-04-30 DIAGNOSIS — Z79.899: ICD-10-CM

## 2021-04-30 DIAGNOSIS — Z88.8: ICD-10-CM

## 2021-04-30 DIAGNOSIS — D12.6: Primary | ICD-10-CM

## 2021-04-30 DIAGNOSIS — Z88.1: ICD-10-CM

## 2021-04-30 DIAGNOSIS — F17.210: ICD-10-CM

## 2021-04-30 DIAGNOSIS — K64.8: ICD-10-CM

## 2021-04-30 DIAGNOSIS — K57.30: ICD-10-CM

## 2021-04-30 NOTE — ROOR
________________________________________________________________________________

Patient Name: Francesco Castañeda            Procedure Date: 4/30/2021 11:46 AM

MRN: [id number]                          Account Number: [id number]

YOB: 1950                Age: 70

Room: [facility]                            Gender: Male

Note Status: Finalized                 

________________________________________________________________________________

 

Procedure:            Colonoscopy

Indications:          Last colonoscopy: February 2019, Therapeutic procedure 

                      for known colon adenoma

Providers:            Eleno KLEIN MD

Referring MD:         MILTON GARBER MD

Requesting Provider:  

Medicines:            Monitored Anesthesia Care

Complications:        No immediate complications.

________________________________________________________________________________

Procedure:            Pre-Anesthesia Assessment:

                      - The heart rate, respiratory rate, oxygen saturations, 

                      blood pressure, adequacy of pulmonary ventilation, and 

                      response to care were monitored throughout the procedure.

                      The Colonoscope was introduced through the anus and 

                      advanced to the terminal ileum, with identification of 

                      the appendiceal orifice and IC valve. The colonoscopy 

                      was performed without difficulty. The patient tolerated 

                      the procedure well. The quality of the bowel preparation 

                      was good.

                                                                                

Findings:

     The perianal and digital rectal examinations were normal.

     A tattoo was seen in the proximal ascending colon. A post-polypectomy 

     scar was found at the tattoo site. There was no evidence of residual 

     polyp tissue.

     A 5 mm polyp was found in the splenic flexure. The polyp was sessile. The 

     polyp was removed with a cold snare. Resection and retrieval were 

     complete.

     A 5 mm polyp was found in the sigmoid colon. The polyp was sessile. The 

     polyp was removed with a cold snare. Resection and retrieval were 

     complete.

     Mild sigmoid diverticulosis and moderate internal hemorrhoids.

     The exam was otherwise without abnormality on direct and retroflexion 

     views.

                                                                                

Impression:           - A tattoo was seen in the proximal ascending colon. A 

                      post-polypectomy scar was found at the tattoo site. 

                      There was no evidence of residual polyp tissue.

                      - One 5 mm polyp at the splenic flexure, removed with a 

                      cold snare. Resected and retrieved.

                      - One 5 mm polyp in the sigmoid colon, removed with a 

                      cold snare. Resected and retrieved.

                      - Mild sigmoid diverticulosis and moderate internal 

                      hemorrhoids.

                      - The examination was otherwise normal on direct and 

                      retroflexion views.

Recommendation:       - Repeat colonoscopy in 5 years for surveillance.

                                                                                

Procedure Code(s):    --- Professional ---

                      18658, Colonoscopy, flexible; with removal of tumor(s), 

                      polyp(s), or other lesion(s) by snare technique

Diagnosis Code(s):    --- Professional ---

                      D12.6, Benign neoplasm of colon, unspecified

                      K63.5, Polyp of colon

 

CPT copyright 2019 [employer]. All rights reserved.

 

The codes documented in this report are preliminary and upon  review may 

be revised to meet current compliance requirements.

 

Eleno Klein MD

________________

Eleno KLEIN MD

4/30/2021 12:21:50 PM

Electronically signed by Eleno KLEIN MD

Number of Addenda: 0

 

Note Initiated On: 4/30/2021 11:46 AM

Estimated Blood Loss: Estimated blood loss: none.

## 2021-05-07 ENCOUNTER — HOSPITAL ENCOUNTER (OUTPATIENT)
Dept: HOSPITAL 53 - M WHC | Age: 71
End: 2021-05-07
Attending: STUDENT IN AN ORGANIZED HEALTH CARE EDUCATION/TRAINING PROGRAM
Payer: MEDICARE

## 2021-05-07 DIAGNOSIS — N62: Primary | ICD-10-CM

## 2021-05-07 PROCEDURE — 77066 DX MAMMO INCL CAD BI: CPT

## 2021-05-07 NOTE — REP
INDICATION:

[facility]AG MAMMO/R63.0 BREAST LUMP - RIGHT.  The patient reports that the lump is

regressing since the time the breast ultrasound.



COMPARISON:

Comparison sonography of the right and left breast is from 14 April 2021.



TECHNIQUE:

Cc and MLO views of each breast were augmented by magnified focal spot-compression

images of the right breast.  A skin marker is a fix the skin at the site of the

palpable abnormality.



FINDINGS:

There is a small amount of fibroglandular tissue the DC nipple on the MLO projection

image of the right breast.  No discernible mass lesion is seen.  This is not well

displayed on the CC projection image.  There is a small normal size right axillary

lymph node visible.  No abnormality noted on the left..



IMPRESSION:

BIRADS/ACR category 2 benign findings.  Findings consistent with mild unilateral

right-sided gynecomastia.



This mammogram was interpreted with the aid of an FDA-approved computer-aided

detection system.



The patient states he had a clinical breast exam in April 14, 2021..



The patient letter being requested is male patient letter M2.



RECOMMENDATION:

Clinical follow-up is advised.





<Electronically signed by Yasir Leal > 05/07/21 [id number]

## 2021-05-28 ENCOUNTER — HOSPITAL ENCOUNTER (OUTPATIENT)
Dept: HOSPITAL 53 - M SFHCPLAZ | Age: 71
End: 2021-05-28
Attending: FAMILY MEDICINE
Payer: MEDICARE

## 2021-05-28 DIAGNOSIS — Z79.899: ICD-10-CM

## 2021-05-28 DIAGNOSIS — Z13.1: Primary | ICD-10-CM

## 2021-05-28 DIAGNOSIS — Z13.220: ICD-10-CM

## 2021-05-28 LAB
CHOLEST SERPL-MCNC: 208 MG/DL (ref ?–200)
CHOLEST/HDLC SERPL: 2.48 {RATIO} (ref ?–5)
EST. AVERAGE GLUCOSE BLD GHB EST-MCNC: 108 MG/DL (ref 60–110)
HDLC SERPL-MCNC: 84 MG/DL (ref 40–?)
LDLC SERPL CALC-MCNC: 106 MG/DL (ref ?–100)
NONHDLC SERPL-MCNC: 124 MG/DL
TRIGL SERPL-MCNC: 91 MG/DL (ref ?–150)

## 2021-05-28 PROCEDURE — 80061 LIPID PANEL: CPT

## 2021-05-28 PROCEDURE — 36415 COLL VENOUS BLD VENIPUNCTURE: CPT

## 2021-05-28 PROCEDURE — 83036 HEMOGLOBIN GLYCOSYLATED A1C: CPT

## 2021-06-09 ENCOUNTER — HOSPITAL ENCOUNTER (OUTPATIENT)
Dept: HOSPITAL 53 - M LAB REF | Age: 71
End: 2021-06-09
Attending: PHYSICIAN ASSISTANT
Payer: MEDICARE

## 2021-06-09 DIAGNOSIS — D22.39: Primary | ICD-10-CM

## 2021-06-09 PROCEDURE — 88305 TISSUE EXAM BY PATHOLOGIST: CPT

## 2021-06-09 PROCEDURE — 17110 DESTRUCTION B9 LES UP TO 14: CPT

## 2021-06-09 PROCEDURE — 11102 TANGNTL BX SKIN SINGLE LES: CPT

## 2021-06-21 ENCOUNTER — HOSPITAL ENCOUNTER (OUTPATIENT)
Dept: HOSPITAL 53 - M LAB | Age: 71
End: 2021-06-21
Attending: UROLOGY
Payer: MEDICARE

## 2021-06-21 DIAGNOSIS — C61: Primary | ICD-10-CM

## 2021-07-29 ENCOUNTER — HOSPITAL ENCOUNTER (OUTPATIENT)
Dept: HOSPITAL 53 - M PLAIMG | Age: 71
End: 2021-07-29
Attending: STUDENT IN AN ORGANIZED HEALTH CARE EDUCATION/TRAINING PROGRAM
Payer: MEDICARE

## 2021-07-29 DIAGNOSIS — M25.511: ICD-10-CM

## 2021-07-29 DIAGNOSIS — Z13.820: Primary | ICD-10-CM

## 2021-07-29 NOTE — REP
INDICATION:

PAIN IN RIGHT SHOULDER.



COMPARISON:

Right shoulder 07/19/2013



TECHNIQUE:

Three views of each shoulder



FINDINGS:

Right shoulder: AC joint shows some mild narrowing and inferior spurring.

Glenohumeral joint also shows some inferior spurring with no subluxation or

dislocation of the glenohumeral joint there is no visible fracture or abnormal soft

tissue calcification.  The ribs grossly intact.



Left shoulder: AC joint shows some narrowing.  I do not see significant spur

formation.  There are degenerative changes with small spurring at the inferior margin

of the glenohumeral joint there is a prominent Hill-Sachs deformity suggested, likely

from prior dislocation.  No abnormal soft tissue calcification.  No acute fracture or

destructive lesion.  Visualized ribs intact.



IMPRESSION:

1. Right shoulder with AC and glenohumeral joint degenerative changes without acute

fracture, subluxation, abnormal soft tissue calcification or focal bone lesion.

2. Left shoulder with AC and glenohumeral joint degenerative changes without acute

fracture, subluxation, abnormal soft tissue calcification but with irregular cortical

defect laterally consistent with a prominent Hill-Sachs deformity.  No acute bony

finding .





<Electronically signed by Leonardo Carmichael > 07/29/21 1128

## 2021-08-10 ENCOUNTER — HOSPITAL ENCOUNTER (OUTPATIENT)
Dept: HOSPITAL 53 - M WHC | Age: 71
End: 2021-08-10
Attending: STUDENT IN AN ORGANIZED HEALTH CARE EDUCATION/TRAINING PROGRAM
Payer: MEDICARE

## 2021-08-10 DIAGNOSIS — M85.851: ICD-10-CM

## 2021-08-10 DIAGNOSIS — M85.852: ICD-10-CM

## 2021-08-10 DIAGNOSIS — Z13.820: Primary | ICD-10-CM

## 2021-08-10 NOTE — DEXAMM
INDICATION:

Z13.820 OSTEOPOROSIS.



COMPARISON:

None.



TECHNIQUE:

Bone density was measured using dual-energy x-ray absorptionmetry (DEXA).



FINDINGS:

AP SPINE L1-L4

BMD 1.243 g/cm2

Young Adult T-Score 0.4

Age Matched Z-Score 0.6.



LT FEMUR, TOTAL

BMD 0.781 g/cm2

Young Adult T-Score -1.8

Age Matched Z-Score -1.4.





IMPRESSION:

There is normal of the spine.



There is low bone density of the left hip.





There is low bone density of the right hip.





FOLLOW-UP:

Recommendation for the next bone density exam: 2 years.





<Electronically signed by Kennedy Brown > 08/10/21 0013

## 2021-10-08 ENCOUNTER — HOSPITAL ENCOUNTER (OUTPATIENT)
Dept: HOSPITAL 53 - M PLAIMG | Age: 71
End: 2021-10-08
Attending: STUDENT IN AN ORGANIZED HEALTH CARE EDUCATION/TRAINING PROGRAM
Payer: MEDICARE

## 2021-10-08 DIAGNOSIS — J06.9: Primary | ICD-10-CM

## 2021-10-08 NOTE — REP
INDICATION:

ACUTE UPPER RESPIRATORY INFECTION, UNSPECIFIED.



COMPARISON:

Comparison chest x-ray March 18, 2019.



TECHNIQUE:

Three views..



FINDINGS:

The lungs are well inflated and free of infiltrate.  The pleural angles are sharp.

The heart size is normal.  Pulmonary vasculature is not increased.  No significant

bony abnormality is seen.



IMPRESSION:

No active disease.





<Electronically signed by Yasir Leal > 10/08/21 [id number]

## 2021-12-16 ENCOUNTER — HOSPITAL ENCOUNTER (EMERGENCY)
Dept: HOSPITAL 53 - M ED | Age: 71
Discharge: HOME | End: 2021-12-16
Payer: MEDICARE

## 2021-12-16 VITALS — DIASTOLIC BLOOD PRESSURE: 78 MMHG | SYSTOLIC BLOOD PRESSURE: 157 MMHG

## 2021-12-16 VITALS — BODY MASS INDEX: 27.43 KG/M2 | HEIGHT: 68 IN | WEIGHT: 181 LBS

## 2021-12-16 DIAGNOSIS — Z88.1: ICD-10-CM

## 2021-12-16 DIAGNOSIS — F17.210: ICD-10-CM

## 2021-12-16 DIAGNOSIS — U07.1: ICD-10-CM

## 2021-12-16 DIAGNOSIS — J44.9: ICD-10-CM

## 2021-12-16 DIAGNOSIS — Z79.82: ICD-10-CM

## 2021-12-16 DIAGNOSIS — Z88.8: ICD-10-CM

## 2021-12-16 DIAGNOSIS — F12.20: ICD-10-CM

## 2021-12-16 DIAGNOSIS — Z79.899: ICD-10-CM

## 2021-12-16 DIAGNOSIS — R09.81: Primary | ICD-10-CM

## 2021-12-16 LAB — RSV RNA NPH QL NAA+PROBE: NEGATIVE

## 2021-12-16 NOTE — REP
INDICATION:

cough, lungs coarse



COMPARISON:

10/08/2021



TECHNIQUE:

PA and lateral.



FINDINGS:

The mediastinum and cardiac silhouette are normal.  The lung fields demonstrate

chronic appearing changes although subtle diffuse bronchitis cannot be excluded.  No

focal consolidation.  No effusion.  No pneumothorax.  The skeletal structures are

intact and normal.



IMPRESSION:

Presumed chronic interstitial changes.  Bronchitis cannot be excluded.  No focal

consolidation or effusion.





<Electronically signed by Robert Chauhan > 12/16/21 0950

## 2021-12-17 ENCOUNTER — HOSPITAL ENCOUNTER (OUTPATIENT)
Dept: HOSPITAL 53 - M OPCLI4 | Age: 71
Discharge: HOME | End: 2021-12-17
Attending: INTERNAL MEDICINE
Payer: MEDICARE

## 2021-12-17 VITALS — SYSTOLIC BLOOD PRESSURE: 147 MMHG | DIASTOLIC BLOOD PRESSURE: 78 MMHG

## 2021-12-17 VITALS — HEIGHT: 68 IN | WEIGHT: 173.06 LBS | BODY MASS INDEX: 26.23 KG/M2

## 2021-12-17 VITALS — SYSTOLIC BLOOD PRESSURE: 150 MMHG | DIASTOLIC BLOOD PRESSURE: 72 MMHG

## 2021-12-17 VITALS — DIASTOLIC BLOOD PRESSURE: 63 MMHG | SYSTOLIC BLOOD PRESSURE: 140 MMHG

## 2021-12-17 VITALS — SYSTOLIC BLOOD PRESSURE: 144 MMHG | DIASTOLIC BLOOD PRESSURE: 66 MMHG

## 2021-12-17 DIAGNOSIS — Z88.1: ICD-10-CM

## 2021-12-17 DIAGNOSIS — Z88.8: ICD-10-CM

## 2021-12-17 DIAGNOSIS — U07.1: Primary | ICD-10-CM

## 2021-12-30 ENCOUNTER — HOSPITAL ENCOUNTER (OUTPATIENT)
Dept: HOSPITAL 53 - M PLALAB | Age: 71
End: 2021-12-30
Attending: UROLOGY
Payer: MEDICARE

## 2021-12-30 DIAGNOSIS — Z85.46: Primary | ICD-10-CM

## 2022-03-29 ENCOUNTER — HOSPITAL ENCOUNTER (EMERGENCY)
Dept: HOSPITAL 53 - M ED | Age: 72
Discharge: LEFT BEFORE DISCHARGE | End: 2022-03-29
Payer: MEDICARE

## 2022-03-29 VITALS — HEIGHT: 69 IN | WEIGHT: 181.37 LBS | BODY MASS INDEX: 26.86 KG/M2

## 2022-03-29 VITALS — SYSTOLIC BLOOD PRESSURE: 162 MMHG | DIASTOLIC BLOOD PRESSURE: 85 MMHG

## 2022-03-29 DIAGNOSIS — Z53.29: Primary | ICD-10-CM

## 2022-04-02 ENCOUNTER — HOSPITAL ENCOUNTER (EMERGENCY)
Dept: HOSPITAL 53 - M ED | Age: 72
Discharge: HOME | End: 2022-04-02
Payer: MEDICARE

## 2022-04-02 VITALS
WEIGHT: 180.38 LBS | HEIGHT: 69 IN | BODY MASS INDEX: 26.72 KG/M2 | SYSTOLIC BLOOD PRESSURE: 175 MMHG | DIASTOLIC BLOOD PRESSURE: 85 MMHG

## 2022-04-02 DIAGNOSIS — Z88.1: ICD-10-CM

## 2022-04-02 DIAGNOSIS — E78.5: ICD-10-CM

## 2022-04-02 DIAGNOSIS — Z79.82: ICD-10-CM

## 2022-04-02 DIAGNOSIS — Z87.19: ICD-10-CM

## 2022-04-02 DIAGNOSIS — J44.9: ICD-10-CM

## 2022-04-02 DIAGNOSIS — R51.9: ICD-10-CM

## 2022-04-02 DIAGNOSIS — F12.20: ICD-10-CM

## 2022-04-02 DIAGNOSIS — Z79.899: ICD-10-CM

## 2022-04-02 DIAGNOSIS — L72.0: Primary | ICD-10-CM

## 2022-04-02 DIAGNOSIS — Z85.46: ICD-10-CM

## 2022-04-02 DIAGNOSIS — Z88.8: ICD-10-CM

## 2022-04-02 DIAGNOSIS — F33.9: ICD-10-CM

## 2022-04-02 DIAGNOSIS — I10: ICD-10-CM

## 2022-04-02 DIAGNOSIS — F17.210: ICD-10-CM

## 2022-04-04 ENCOUNTER — HOSPITAL ENCOUNTER (OUTPATIENT)
Dept: HOSPITAL 53 - M RAD | Age: 72
End: 2022-04-04
Attending: STUDENT IN AN ORGANIZED HEALTH CARE EDUCATION/TRAINING PROGRAM
Payer: MEDICARE

## 2022-04-04 DIAGNOSIS — Z79.899: ICD-10-CM

## 2022-04-04 DIAGNOSIS — Z12.2: Primary | ICD-10-CM

## 2022-04-04 DIAGNOSIS — Z79.51: ICD-10-CM

## 2022-06-13 ENCOUNTER — HOSPITAL ENCOUNTER (OUTPATIENT)
Dept: HOSPITAL 53 - M SFHCPLAZ | Age: 72
End: 2022-06-13
Attending: STUDENT IN AN ORGANIZED HEALTH CARE EDUCATION/TRAINING PROGRAM
Payer: MEDICARE

## 2022-06-13 DIAGNOSIS — I10: ICD-10-CM

## 2022-06-13 DIAGNOSIS — E78.2: Primary | ICD-10-CM

## 2022-06-13 DIAGNOSIS — Z13.1: ICD-10-CM

## 2022-06-24 ENCOUNTER — HOSPITAL ENCOUNTER (OUTPATIENT)
Dept: HOSPITAL 53 - M PLALAB | Age: 72
End: 2022-06-24
Attending: STUDENT IN AN ORGANIZED HEALTH CARE EDUCATION/TRAINING PROGRAM
Payer: MEDICARE

## 2022-06-24 ENCOUNTER — HOSPITAL ENCOUNTER (OUTPATIENT)
Dept: HOSPITAL 53 - M PLALAB | Age: 72
End: 2022-06-24
Attending: UROLOGY
Payer: MEDICARE

## 2022-06-24 DIAGNOSIS — Z79.899: ICD-10-CM

## 2022-06-24 DIAGNOSIS — Z13.1: ICD-10-CM

## 2022-06-24 DIAGNOSIS — E78.2: Primary | ICD-10-CM

## 2022-06-24 DIAGNOSIS — I10: ICD-10-CM

## 2022-06-24 DIAGNOSIS — Z85.46: ICD-10-CM

## 2022-06-24 DIAGNOSIS — Z85.46: Primary | ICD-10-CM

## 2022-06-24 LAB
BUN SERPL-MCNC: 19 MG/DL (ref 7–18)
CALCIUM SERPL-MCNC: 9.7 MG/DL (ref 8.8–10.2)
CHLORIDE SERPL-SCNC: 106 MEQ/L (ref 98–107)
CHOLEST SERPL-MCNC: 196 MG/DL (ref ?–200)
CHOLEST/HDLC SERPL: 2.72 {RATIO} (ref ?–5)
CO2 SERPL-SCNC: 27 MEQ/L (ref 21–32)
CREAT SERPL-MCNC: 1.01 MG/DL (ref 0.7–1.3)
EST. AVERAGE GLUCOSE BLD GHB EST-MCNC: 111 MG/DL (ref 60–110)
GFR SERPL CREATININE-BSD FRML MDRD: > 60 ML/MIN/{1.73_M2} (ref 42–?)
GLUCOSE SERPL-MCNC: 94 MG/DL (ref 70–100)
HDLC SERPL-MCNC: 72 MG/DL (ref 40–?)
LDLC SERPL CALC-MCNC: 84 MG/DL (ref ?–100)
NONHDLC SERPL-MCNC: 124 MG/DL
POTASSIUM SERPL-SCNC: 3.6 MEQ/L (ref 3.5–5.1)
PSA SERPL-MCNC: 0.34 NG/ML (ref ?–4)
SODIUM SERPL-SCNC: 141 MEQ/L (ref 136–145)
TRIGL SERPL-MCNC: 198 MG/DL (ref ?–150)

## 2022-08-30 ENCOUNTER — HOSPITAL ENCOUNTER (OUTPATIENT)
Dept: HOSPITAL 53 - M RAD | Age: 72
End: 2022-08-30
Payer: MEDICARE

## 2022-08-30 DIAGNOSIS — Z79.82: ICD-10-CM

## 2022-08-30 DIAGNOSIS — Z79.51: ICD-10-CM

## 2022-08-30 DIAGNOSIS — R14.0: Primary | ICD-10-CM

## 2022-08-30 DIAGNOSIS — Z79.899: ICD-10-CM

## 2022-08-30 LAB
ALBUMIN SERPL BCG-MCNC: 3.7 GM/DL (ref 3.2–5.2)
ALT SERPL W P-5'-P-CCNC: 38 U/L (ref 12–78)
BILIRUB SERPL-MCNC: 0.5 MG/DL (ref 0.2–1)
BUN SERPL-MCNC: 16 MG/DL (ref 7–18)
CALCIUM SERPL-MCNC: 10 MG/DL (ref 8.8–10.2)
CHLORIDE SERPL-SCNC: 100 MEQ/L (ref 98–107)
CO2 SERPL-SCNC: 31 MEQ/L (ref 21–32)
CREAT SERPL-MCNC: 1.08 MG/DL (ref 0.7–1.3)
GFR SERPL CREATININE-BSD FRML MDRD: > 60 ML/MIN/{1.73_M2} (ref 42–?)
GLUCOSE SERPL-MCNC: 112 MG/DL (ref 70–100)
HBV CORE IGM SER QL: NEGATIVE
HBV SURFACE AB SER QL: NEGATIVE
HCT VFR BLD AUTO: 46 % (ref 42–52)
HCV AB SER QL: < 0 INDEX (ref ?–0.8)
HGB BLD-MCNC: 15.6 G/DL (ref 13.5–17.5)
MCH RBC QN AUTO: 33.4 PG (ref 27–33)
MCHC RBC AUTO-ENTMCNC: 33.9 G/DL (ref 32–36.5)
MCV RBC AUTO: 98.5 FL (ref 80–96)
NT-PRO BNP: 39 PG/ML (ref ?–125)
PLATELET # BLD AUTO: 257 10^3/UL (ref 150–450)
POTASSIUM SERPL-SCNC: 3.9 MEQ/L (ref 3.5–5.1)
PROT SERPL-MCNC: 7.4 GM/DL (ref 6.4–8.2)
RBC # BLD AUTO: 4.67 10^6/UL (ref 4.3–6.1)
SODIUM SERPL-SCNC: 136 MEQ/L (ref 136–145)
WBC # BLD AUTO: 7.5 10^3/UL (ref 4–10)

## 2022-08-31 ENCOUNTER — HOSPITAL ENCOUNTER (OUTPATIENT)
Dept: HOSPITAL 53 - M WHC | Age: 72
End: 2022-08-31
Payer: MEDICARE

## 2022-08-31 DIAGNOSIS — N28.1: Primary | ICD-10-CM

## 2022-10-06 ENCOUNTER — HOSPITAL ENCOUNTER (OUTPATIENT)
Dept: HOSPITAL 53 - M SFHCPLAZ | Age: 72
End: 2022-10-06
Attending: FAMILY MEDICINE
Payer: MEDICARE

## 2022-10-06 DIAGNOSIS — Z85.828: ICD-10-CM

## 2022-10-06 DIAGNOSIS — D22.72: Primary | ICD-10-CM

## 2022-10-27 ENCOUNTER — HOSPITAL ENCOUNTER (OUTPATIENT)
Dept: HOSPITAL 53 - M PLALAB | Age: 72
End: 2022-10-27
Payer: MEDICARE

## 2022-10-27 DIAGNOSIS — R14.0: Primary | ICD-10-CM

## 2022-10-27 LAB
BUN SERPL-MCNC: 15 MG/DL (ref 7–18)
CALCIUM SERPL-MCNC: 8.8 MG/DL (ref 8.8–10.2)
CHLORIDE SERPL-SCNC: 106 MEQ/L (ref 98–107)
CO2 SERPL-SCNC: 26 MEQ/L (ref 21–32)
CREAT SERPL-MCNC: 0.93 MG/DL (ref 0.7–1.3)
GFR SERPL CREATININE-BSD FRML MDRD: > 60 ML/MIN/{1.73_M2} (ref 42–?)
GLUCOSE SERPL-MCNC: 87 MG/DL (ref 70–100)
POTASSIUM SERPL-SCNC: 3.6 MEQ/L (ref 3.5–5.1)
SODIUM SERPL-SCNC: 140 MEQ/L (ref 136–145)

## 2022-11-02 ENCOUNTER — HOSPITAL ENCOUNTER (OUTPATIENT)
Dept: HOSPITAL 53 - M RAD | Age: 72
End: 2022-11-02
Payer: MEDICARE

## 2022-11-02 DIAGNOSIS — R14.0: Primary | ICD-10-CM

## 2022-11-02 PROCEDURE — 74177 CT ABD & PELVIS W/CONTRAST: CPT

## 2022-11-09 ENCOUNTER — HOSPITAL ENCOUNTER (OUTPATIENT)
Dept: HOSPITAL 53 - M SFHCPLAZ | Age: 72
End: 2022-11-09
Payer: MEDICARE

## 2022-11-09 DIAGNOSIS — M79.89: Primary | ICD-10-CM

## 2022-11-09 LAB
APPEARANCE UR: CLEAR
BACTERIA URNS QL MICRO: (no result)
BILIRUB UR QL STRIP: NEGATIVE
COLOR UR: YELLOW
GLUCOSE UR STRIP-MCNC: NEGATIVE MG/DL
HGB UR QL STRIP: (no result)
KETONES UR QL STRIP: NEGATIVE MG/DL
LEUKOCYTE ESTERASE UR QL STRIP: NEGATIVE
MUCOUS THREADS URNS QL MICRO: (no result)
NITRITE UR QL STRIP: NEGATIVE
PH UR STRIP: 6 UNITS (ref 5–7)
PROT UR STRIP-MCNC: (no result) MG/DL
RBC #/AREA URNS HPF: (no result) /HPF (ref 0–3)
SP GR UR STRIP: 1.02 (ref 1–1.03)
SQUAMOUS URNS QL MICRO: (no result) /HPF
UROBILINOGEN UR QL STRIP: NORMAL MG/DL
WBC #/AREA URNS HPF: (no result) /HPF (ref 0–3)

## 2022-12-28 ENCOUNTER — HOSPITAL ENCOUNTER (EMERGENCY)
Dept: HOSPITAL 53 - M ED | Age: 72
Discharge: HOME | End: 2022-12-28
Payer: MEDICARE

## 2022-12-28 VITALS — SYSTOLIC BLOOD PRESSURE: 130 MMHG | DIASTOLIC BLOOD PRESSURE: 78 MMHG

## 2022-12-28 VITALS — WEIGHT: 200.4 LBS | BODY MASS INDEX: 30.37 KG/M2 | HEIGHT: 68 IN

## 2022-12-28 DIAGNOSIS — F41.9: ICD-10-CM

## 2022-12-28 DIAGNOSIS — I10: ICD-10-CM

## 2022-12-28 DIAGNOSIS — Z88.8: ICD-10-CM

## 2022-12-28 DIAGNOSIS — Z88.1: ICD-10-CM

## 2022-12-28 DIAGNOSIS — Z79.899: ICD-10-CM

## 2022-12-28 DIAGNOSIS — F17.200: ICD-10-CM

## 2022-12-28 DIAGNOSIS — F10.10: ICD-10-CM

## 2022-12-28 DIAGNOSIS — K57.92: ICD-10-CM
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
## 2022-12-28 DIAGNOSIS — E78.5: ICD-10-CM

## 2022-12-28 DIAGNOSIS — F32.9: ICD-10-CM

## 2022-12-28 DIAGNOSIS — S61.213A: Primary | ICD-10-CM

## 2022-12-28 DIAGNOSIS — J44.9: ICD-10-CM

## 2022-12-28 DIAGNOSIS — R04.0: ICD-10-CM

## 2022-12-28 DIAGNOSIS — Z79.82: ICD-10-CM

## 2023-01-12 ENCOUNTER — HOSPITAL ENCOUNTER (OUTPATIENT)
Dept: HOSPITAL 53 - M LAB | Age: 73
End: 2023-01-12
Attending: UROLOGY
Payer: MEDICARE

## 2023-01-12 DIAGNOSIS — C61: Primary | ICD-10-CM

## 2023-01-16 ENCOUNTER — HOSPITAL ENCOUNTER (EMERGENCY)
Dept: HOSPITAL 53 - M ED | Age: 73
Discharge: LEFT BEFORE DISCHARGE | End: 2023-01-16
Payer: MEDICARE

## 2023-01-16 VITALS — HEIGHT: 69 IN | WEIGHT: 200.62 LBS | BODY MASS INDEX: 29.71 KG/M2

## 2023-01-16 VITALS — DIASTOLIC BLOOD PRESSURE: 77 MMHG | SYSTOLIC BLOOD PRESSURE: 167 MMHG

## 2023-01-16 DIAGNOSIS — Z53.21: Primary | ICD-10-CM

## 2023-02-09 ENCOUNTER — HOSPITAL ENCOUNTER (OUTPATIENT)
Dept: HOSPITAL 53 - M SFHCPLAZ | Age: 73
End: 2023-02-09
Attending: FAMILY MEDICINE
Payer: MEDICARE

## 2023-02-09 DIAGNOSIS — D22.72: Primary | ICD-10-CM

## 2023-03-28 ENCOUNTER — HOSPITAL ENCOUNTER (OUTPATIENT)
Dept: HOSPITAL 53 - M PLALAB | Age: 73
End: 2023-03-28
Attending: PSYCHIATRY & NEUROLOGY
Payer: MEDICARE

## 2023-03-28 DIAGNOSIS — Z11.3: ICD-10-CM

## 2023-03-28 DIAGNOSIS — D51.9: ICD-10-CM

## 2023-03-28 DIAGNOSIS — R41.3: Primary | ICD-10-CM

## 2023-03-28 LAB
ALBUMIN SERPL BCG-MCNC: 3.7 G/DL (ref 3.2–5.2)
ALP SERPL-CCNC: 90 U/L (ref 46–116)
ALT SERPL W P-5'-P-CCNC: 59 U/L (ref 7–40)
AST SERPL-CCNC: 36 U/L (ref ?–34)
BASOPHILS # BLD AUTO: 0.1 10^3/UL (ref 0–0.2)
BASOPHILS NFR BLD AUTO: 1.6 % (ref 0–1)
BILIRUB SERPL-MCNC: 0.4 MG/DL (ref 0.3–1.2)
BUN SERPL-MCNC: 21 MG/DL (ref 9–23)
CALCIUM SERPL-MCNC: 10.6 MG/DL (ref 8.3–10.6)
CHLORIDE SERPL-SCNC: 102 MMOL/L (ref 98–107)
CO2 SERPL-SCNC: 30 MMOL/L (ref 20–31)
CREAT SERPL-MCNC: 0.84 MG/DL (ref 0.7–1.3)
EOSINOPHIL # BLD AUTO: 0.4 10^3/UL (ref 0–0.5)
EOSINOPHIL NFR BLD AUTO: 5.5 % (ref 0–3)
ERYTHROCYTE [SEDIMENTATION RATE] IN BLOOD BY WESTERGREN METHOD: 45 MM/HR (ref 0–20)
FOLATE SERPL-MCNC: 13 NG/ML (ref 5.4–?)
FT4I SERPL CALC-MCNC: 2.5 % (ref 1.4–3.8)
GFR SERPL CREATININE-BSD FRML MDRD: > 60 ML/MIN/{1.73_M2} (ref 42–?)
GLUCOSE SERPL-MCNC: 105 MG/DL (ref 74–106)
HCT VFR BLD AUTO: 43.5 % (ref 42–52)
HGB BLD-MCNC: 14.6 G/DL (ref 13.5–17.5)
LYMPHOCYTES # BLD AUTO: 1.9 10^3/UL (ref 1.5–5)
LYMPHOCYTES NFR BLD AUTO: 25.8 % (ref 24–44)
MCH RBC QN AUTO: 33.4 PG (ref 27–33)
MCHC RBC AUTO-ENTMCNC: 33.6 G/DL (ref 32–36.5)
MCV RBC AUTO: 99.5 FL (ref 80–96)
MONOCYTES # BLD AUTO: 0.7 10^3/UL (ref 0–0.8)
MONOCYTES NFR BLD AUTO: 9 % (ref 2–8)
NEUTROPHILS # BLD AUTO: 4.3 10^3/UL (ref 1.5–8.5)
NEUTROPHILS NFR BLD AUTO: 57.3 % (ref 36–66)
PLATELET # BLD AUTO: 344 10^3/UL (ref 150–450)
POTASSIUM SERPL-SCNC: 4.1 MMOL/L (ref 3.5–5.1)
PROT SERPL-MCNC: 6.7 G/DL (ref 5.7–8.2)
RBC # BLD AUTO: 4.37 10^6/UL (ref 4.3–6.1)
SODIUM SERPL-SCNC: 139 MMOL/L (ref 136–145)
T3RU NFR SERPL: 33.5 % (ref 22.5–37)
T4 SERPL-MCNC: 7.6 UG/DL (ref 4.5–10.9)
TSH SERPL DL<=0.005 MIU/L-ACNC: 2.57 UIU/ML (ref 0.55–4.78)
VIT B12 SERPL-MCNC: 619 PG/ML (ref 211–911)
WBC # BLD AUTO: 7.5 10^3/UL (ref 4–10)

## 2023-04-26 ENCOUNTER — HOSPITAL ENCOUNTER (OUTPATIENT)
Dept: HOSPITAL 53 - M CARPUL | Age: 73
End: 2023-04-26
Payer: MEDICARE

## 2023-04-26 DIAGNOSIS — R06.02: Primary | ICD-10-CM

## 2023-04-26 DIAGNOSIS — N50.811: ICD-10-CM

## 2023-05-19 ENCOUNTER — HOSPITAL ENCOUNTER (OUTPATIENT)
Dept: HOSPITAL 53 - M LAB | Age: 73
End: 2023-05-19
Attending: UROLOGY
Payer: MEDICARE

## 2023-05-19 DIAGNOSIS — C61: Primary | ICD-10-CM

## 2023-05-22 ENCOUNTER — HOSPITAL ENCOUNTER (OUTPATIENT)
Dept: HOSPITAL 53 - M RAD | Age: 73
End: 2023-05-22
Attending: UROLOGY
Payer: MEDICARE

## 2023-05-22 DIAGNOSIS — N50.811: Primary | ICD-10-CM

## 2023-06-23 ENCOUNTER — HOSPITAL ENCOUNTER (OUTPATIENT)
Dept: HOSPITAL 53 - M RAD | Age: 73
End: 2023-06-23
Payer: MEDICARE

## 2023-06-23 DIAGNOSIS — Z12.2: Primary | ICD-10-CM

## 2023-06-23 DIAGNOSIS — Z87.891: ICD-10-CM

## 2023-06-23 DIAGNOSIS — J47.9: ICD-10-CM

## 2023-06-23 DIAGNOSIS — J98.09: ICD-10-CM

## 2023-08-08 ENCOUNTER — HOSPITAL ENCOUNTER (OUTPATIENT)
Dept: HOSPITAL 53 - M WHC | Age: 73
End: 2023-08-08
Payer: MEDICARE

## 2023-08-08 DIAGNOSIS — Z12.31: Primary | ICD-10-CM

## 2023-08-08 DIAGNOSIS — N62: ICD-10-CM

## 2023-08-08 PROCEDURE — 77066 DX MAMMO INCL CAD BI: CPT

## 2023-09-11 DIAGNOSIS — N62: ICD-10-CM

## 2023-09-11 DIAGNOSIS — Z53.09: ICD-10-CM

## 2023-09-11 DIAGNOSIS — C61: Primary | ICD-10-CM

## 2024-01-09 ENCOUNTER — HOSPITAL ENCOUNTER (OUTPATIENT)
Dept: HOSPITAL 53 - M PLALAB | Age: 74
End: 2024-01-09
Attending: UROLOGY
Payer: MEDICARE

## 2024-01-09 ENCOUNTER — HOSPITAL ENCOUNTER (OUTPATIENT)
Dept: HOSPITAL 53 - M PLALAB | Age: 74
End: 2024-01-09
Payer: MEDICARE

## 2024-01-09 DIAGNOSIS — C61: Primary | ICD-10-CM

## 2024-01-09 DIAGNOSIS — R06.02: Primary | ICD-10-CM

## 2024-01-09 LAB
BUN SERPL-MCNC: 11 MG/DL (ref 9–23)
CALCIUM SERPL-MCNC: 9.3 MG/DL (ref 8.3–10.6)
CHLORIDE SERPL-SCNC: 104 MMOL/L (ref 98–107)
CO2 SERPL-SCNC: 30 MMOL/L (ref 20–31)
CREAT SERPL-MCNC: 0.89 MG/DL (ref 0.7–1.3)
GFR SERPL CREATININE-BSD FRML MDRD: > 60 ML/MIN/{1.73_M2} (ref 42–?)
GLUCOSE SERPL-MCNC: 101 MG/DL (ref 74–106)
POTASSIUM SERPL-SCNC: 3.8 MMOL/L (ref 3.5–5.1)
SODIUM SERPL-SCNC: 141 MMOL/L (ref 136–145)

## 2024-05-21 ENCOUNTER — HOSPITAL ENCOUNTER (OUTPATIENT)
Dept: HOSPITAL 53 - M PLALAB | Age: 74
End: 2024-05-21
Payer: MEDICARE

## 2024-05-21 DIAGNOSIS — E78.2: Primary | ICD-10-CM

## 2024-05-21 DIAGNOSIS — F10.11: ICD-10-CM

## 2024-05-21 DIAGNOSIS — Z13.1: ICD-10-CM

## 2024-05-21 LAB
ALBUMIN SERPL BCG-MCNC: 3.9 G/DL (ref 3.2–5.2)
ALP SERPL-CCNC: 88 U/L (ref 46–116)
ALT SERPL W P-5'-P-CCNC: 36 U/L (ref 7–40)
AST SERPL-CCNC: 20 U/L (ref ?–34)
BILIRUB SERPL-MCNC: 0.8 MG/DL (ref 0.3–1.2)
BUN SERPL-MCNC: 23 MG/DL (ref 9–23)
CALCIUM SERPL-MCNC: 10.1 MG/DL (ref 8.3–10.6)
CHLORIDE SERPL-SCNC: 103 MMOL/L (ref 98–107)
CHOLEST SERPL-MCNC: 226 MG/DL (ref ?–200)
CHOLEST/HDLC SERPL: 3.7 {RATIO} (ref ?–5)
CO2 SERPL-SCNC: 29 MMOL/L (ref 20–31)
CREAT SERPL-MCNC: 1.18 MG/DL (ref 0.7–1.3)
EST. AVERAGE GLUCOSE BLD GHB EST-MCNC: 108 MG/DL (ref 60–110)
GFR SERPL CREATININE-BSD FRML MDRD: > 60 ML/MIN/{1.73_M2} (ref 42–?)
GLUCOSE SERPL-MCNC: 98 MG/DL (ref 74–106)
HDLC SERPL-MCNC: 61 MG/DL (ref 40–?)
LDLC SERPL CALC-MCNC: 130 MG/DL (ref ?–100)
NONHDLC SERPL-MCNC: 165 MG/DL
POTASSIUM SERPL-SCNC: 3.8 MMOL/L (ref 3.5–5.1)
PROT SERPL-MCNC: 6.8 G/DL (ref 5.7–8.2)
SODIUM SERPL-SCNC: 139 MMOL/L (ref 136–145)
TRIGL SERPL-MCNC: 175 MG/DL (ref ?–150)

## 2024-08-02 ENCOUNTER — HOSPITAL ENCOUNTER (OUTPATIENT)
Dept: HOSPITAL 53 - M RAD | Age: 74
End: 2024-08-02
Attending: FAMILY MEDICINE
Payer: MEDICARE

## 2024-08-02 DIAGNOSIS — F17.210: ICD-10-CM

## 2024-08-02 DIAGNOSIS — Z53.9: Primary | ICD-10-CM

## 2024-10-01 ENCOUNTER — HOSPITAL ENCOUNTER (OUTPATIENT)
Dept: HOSPITAL 53 - M RAD | Age: 74
End: 2024-10-01
Payer: MEDICARE

## 2024-10-01 ENCOUNTER — HOSPITAL ENCOUNTER (OUTPATIENT)
Dept: HOSPITAL 53 - M LAB | Age: 74
End: 2024-10-01
Attending: UROLOGY
Payer: MEDICARE

## 2024-10-01 DIAGNOSIS — R91.8: ICD-10-CM

## 2024-10-01 DIAGNOSIS — Z12.2: Primary | ICD-10-CM

## 2024-10-01 DIAGNOSIS — J47.9: ICD-10-CM

## 2024-10-01 DIAGNOSIS — Q61.02: ICD-10-CM

## 2024-10-01 DIAGNOSIS — F17.210: ICD-10-CM

## 2024-10-01 DIAGNOSIS — C61: ICD-10-CM

## 2024-10-01 DIAGNOSIS — C61: Primary | ICD-10-CM

## 2024-11-16 ENCOUNTER — HOSPITAL ENCOUNTER (EMERGENCY)
Dept: HOSPITAL 53 - M ED | Age: 74
Discharge: HOME | End: 2024-11-16
Payer: MEDICAID

## 2024-11-16 VITALS — OXYGEN SATURATION: 93 % | TEMPERATURE: 98 F | SYSTOLIC BLOOD PRESSURE: 147 MMHG | DIASTOLIC BLOOD PRESSURE: 88 MMHG

## 2024-11-16 VITALS — HEIGHT: 68 IN | WEIGHT: 194.23 LBS | BODY MASS INDEX: 29.44 KG/M2

## 2024-11-16 DIAGNOSIS — Z85.46: ICD-10-CM

## 2024-11-16 DIAGNOSIS — J44.1: Primary | ICD-10-CM

## 2024-11-16 DIAGNOSIS — I25.2: ICD-10-CM

## 2024-11-16 DIAGNOSIS — I10: ICD-10-CM

## 2024-11-16 DIAGNOSIS — Z88.8: ICD-10-CM

## 2024-11-16 DIAGNOSIS — Z79.82: ICD-10-CM

## 2024-11-16 DIAGNOSIS — Z79.899: ICD-10-CM

## 2024-11-16 DIAGNOSIS — J44.9: ICD-10-CM

## 2024-11-16 DIAGNOSIS — Z88.1: ICD-10-CM

## 2024-11-16 DIAGNOSIS — B34.8: ICD-10-CM

## 2024-11-16 DIAGNOSIS — F17.200: ICD-10-CM

## 2024-11-16 DIAGNOSIS — E78.5: ICD-10-CM

## 2024-11-16 LAB
ALBUMIN SERPL BCG-MCNC: 3.4 G/DL (ref 3.2–5.2)
ALP SERPL-CCNC: 103 U/L (ref 40–129)
ALT SERPL W P-5'-P-CCNC: 29 U/L (ref 7–40)
AST SERPL-CCNC: 38 U/L (ref ?–34)
BASOPHILS # BLD AUTO: 0.1 10^3/UL (ref 0–0.2)
BASOPHILS NFR BLD AUTO: 0.8 % (ref 0–1)
BILIRUB CONJ SERPL-MCNC: 0.2 MG/DL (ref ?–0.4)
BILIRUB SERPL-MCNC: 0.7 MG/DL (ref 0.3–1.2)
BUN SERPL-MCNC: 14 MG/DL (ref 9–23)
CALCIUM SERPL-MCNC: 9.6 MG/DL (ref 8.3–10.6)
CHLORIDE SERPL-SCNC: 100 MMOL/L (ref 98–107)
CO2 SERPL-SCNC: 29 MMOL/L (ref 20–31)
CREAT SERPL-MCNC: 0.81 MG/DL (ref 0.7–1.3)
EOSINOPHIL # BLD AUTO: 0.9 10^3/UL (ref 0–0.5)
EOSINOPHIL NFR BLD AUTO: 8 % (ref 0–3)
GFR SERPL CREATININE-BSD FRML MDRD: > 60 ML/MIN/{1.73_M2} (ref 42–?)
GLUCOSE SERPL-MCNC: 110 MG/DL (ref 74–106)
HCT VFR BLD AUTO: 44.1 % (ref 42–52)
HGB BLD-MCNC: 15.4 G/DL (ref 13.5–17.5)
LYMPHOCYTES # BLD AUTO: 1.6 10^3/UL (ref 1.5–5)
LYMPHOCYTES NFR BLD AUTO: 15.2 % (ref 24–44)
MCH RBC QN AUTO: 33.8 PG (ref 27–33)
MCHC RBC AUTO-ENTMCNC: 34.9 G/DL (ref 32–36.5)
MCV RBC AUTO: 96.9 FL (ref 80–96)
MONOCYTES # BLD AUTO: 0.9 10^3/UL (ref 0–0.8)
MONOCYTES NFR BLD AUTO: 8.7 % (ref 2–8)
NEUTROPHILS # BLD AUTO: 7.2 10^3/UL (ref 1.5–8.5)
NEUTROPHILS NFR BLD AUTO: 66.7 % (ref 36–66)
PLATELET # BLD AUTO: 221 10^3/UL (ref 150–450)
POTASSIUM SERPL-SCNC: 4 MMOL/L (ref 3.5–5.1)
PROT SERPL-MCNC: 7 G/DL (ref 5.7–8.2)
RBC # BLD AUTO: 4.55 10^6/UL (ref 4.3–6.1)
SODIUM SERPL-SCNC: 135 MMOL/L (ref 136–145)
WBC # BLD AUTO: 10.8 10^3/UL (ref 4–10)

## 2024-11-16 PROCEDURE — 83880 ASSAY OF NATRIURETIC PEPTIDE: CPT

## 2024-11-16 PROCEDURE — 96375 TX/PRO/DX INJ NEW DRUG ADDON: CPT

## 2024-11-16 PROCEDURE — 80048 BASIC METABOLIC PNL TOTAL CA: CPT

## 2024-11-16 PROCEDURE — 94640 AIRWAY INHALATION TREATMENT: CPT

## 2024-11-16 PROCEDURE — 71045 X-RAY EXAM CHEST 1 VIEW: CPT

## 2024-11-16 PROCEDURE — 94760 N-INVAS EAR/PLS OXIMETRY 1: CPT

## 2024-11-16 PROCEDURE — 87633 RESP VIRUS 12-25 TARGETS: CPT

## 2024-11-16 PROCEDURE — 87486 CHLMYD PNEUM DNA AMP PROBE: CPT

## 2024-11-16 PROCEDURE — 93005 ELECTROCARDIOGRAM TRACING: CPT

## 2024-11-16 PROCEDURE — 80076 HEPATIC FUNCTION PANEL: CPT

## 2024-11-16 PROCEDURE — 93041 RHYTHM ECG TRACING: CPT

## 2024-11-16 PROCEDURE — 85025 COMPLETE CBC W/AUTO DIFF WBC: CPT

## 2024-11-16 PROCEDURE — 99285 EMERGENCY DEPT VISIT HI MDM: CPT

## 2024-11-16 PROCEDURE — 87581 M.PNEUMON DNA AMP PROBE: CPT

## 2024-11-16 PROCEDURE — 96374 THER/PROPH/DIAG INJ IV PUSH: CPT

## 2024-11-16 PROCEDURE — 71275 CT ANGIOGRAPHY CHEST: CPT

## 2024-11-16 PROCEDURE — 87798 DETECT AGENT NOS DNA AMP: CPT

## 2024-11-16 RX ADMIN — METHYLPREDNISOLONE SODIUM SUCCINATE ONE MG: 125 INJECTION, POWDER, FOR SOLUTION INTRAMUSCULAR; INTRAVENOUS at 13:24

## 2024-11-16 RX ADMIN — FUROSEMIDE ONE MG: 10 INJECTION, SOLUTION INTRAMUSCULAR; INTRAVENOUS at 12:31

## 2024-11-16 RX ADMIN — IPRATROPIUM BROMIDE AND ALBUTEROL SULFATE ONE ML: .5; 3 SOLUTION RESPIRATORY (INHALATION) at 12:07

## 2025-01-28 ENCOUNTER — HOSPITAL ENCOUNTER (OUTPATIENT)
Dept: HOSPITAL 53 - M LAB | Age: 75
End: 2025-01-28
Attending: UROLOGY
Payer: MEDICARE

## 2025-01-28 DIAGNOSIS — C61: Primary | ICD-10-CM

## 2025-02-04 ENCOUNTER — HOSPITAL ENCOUNTER (OUTPATIENT)
Dept: HOSPITAL 53 - M PLALAB | Age: 75
End: 2025-02-04
Attending: STUDENT IN AN ORGANIZED HEALTH CARE EDUCATION/TRAINING PROGRAM
Payer: MEDICARE

## 2025-02-04 ENCOUNTER — HOSPITAL ENCOUNTER (OUTPATIENT)
Dept: HOSPITAL 53 - M SFHCPLAZ | Age: 75
End: 2025-02-04
Attending: FAMILY MEDICINE
Payer: MEDICARE

## 2025-02-04 DIAGNOSIS — Z11.59: ICD-10-CM

## 2025-02-04 DIAGNOSIS — Z20.5: Primary | ICD-10-CM

## 2025-02-04 DIAGNOSIS — Z53.21: Primary | ICD-10-CM

## 2025-02-04 LAB
HBV SURFACE AB SER QL: NEGATIVE
HBV SURFACE AB SER-ACNC: NEGATIVE M[IU]/ML
HCV AB SER QL: 0.13 INDEX (ref ?–0.8)

## 2025-03-04 ENCOUNTER — HOSPITAL ENCOUNTER (OUTPATIENT)
Dept: HOSPITAL 53 - M CARPUL | Age: 75
End: 2025-03-04
Attending: STUDENT IN AN ORGANIZED HEALTH CARE EDUCATION/TRAINING PROGRAM
Payer: MEDICARE

## 2025-03-04 DIAGNOSIS — R60.0: Primary | ICD-10-CM

## 2025-03-04 DIAGNOSIS — I50.30: ICD-10-CM

## 2025-03-04 DIAGNOSIS — I08.0: ICD-10-CM

## 2025-03-04 DIAGNOSIS — I37.1: ICD-10-CM

## 2025-05-15 ENCOUNTER — HOSPITAL ENCOUNTER (OUTPATIENT)
Dept: HOSPITAL 53 - M PLAIMG | Age: 75
End: 2025-05-15
Payer: MEDICARE

## 2025-05-15 DIAGNOSIS — R60.0: Primary | ICD-10-CM

## 2025-07-25 ENCOUNTER — HOSPITAL ENCOUNTER (OUTPATIENT)
Dept: HOSPITAL 53 - M LAB | Age: 75
End: 2025-07-25
Payer: MEDICARE

## 2025-07-25 DIAGNOSIS — Z13.1: ICD-10-CM

## 2025-07-25 DIAGNOSIS — E78.2: Primary | ICD-10-CM

## 2025-07-25 DIAGNOSIS — F10.21: ICD-10-CM

## 2025-07-25 DIAGNOSIS — C61: ICD-10-CM

## 2025-07-25 LAB
ALBUMIN SERPL BCG-MCNC: 3.6 G/DL (ref 3.2–5.2)
ALP SERPL-CCNC: 81 U/L (ref 40–129)
ALT SERPL W P-5'-P-CCNC: 20 U/L (ref 7–40)
AST SERPL-CCNC: 15 U/L (ref ?–34)
BILIRUB SERPL-MCNC: 0.6 MG/DL (ref 0.3–1.2)
BUN SERPL-MCNC: 17 MG/DL (ref 9–23)
CALCIUM SERPL-MCNC: 9.7 MG/DL (ref 8.3–10.6)
CHLORIDE SERPL-SCNC: 103 MMOL/L (ref 98–107)
CHOLEST SERPL-MCNC: 202 MG/DL (ref ?–200)
CHOLEST/HDLC SERPL: 4.24 {RATIO} (ref ?–5)
CO2 SERPL-SCNC: 30 MMOL/L (ref 20–31)
CREAT SERPL-MCNC: 1.03 MG/DL (ref 0.7–1.3)
EST. AVERAGE GLUCOSE BLD GHB EST-MCNC: 117 MG/DL (ref 60–110)
GFR SERPL CREATININE-BSD FRML MDRD: 74.8 ML/MIN/{1.73_M2} (ref 42–?)
GLUCOSE SERPL-MCNC: 91 MG/DL (ref 74–106)
HBA1C MFR BLD: 5.7 % (ref 4–6)
HDLC SERPL-MCNC: 47.6 MG/DL (ref 40–?)
LDLC SERPL CALC-MCNC: 122.8 MG/DL (ref ?–100)
NONHDLC SERPL-MCNC: 154.4 MG/DL
POTASSIUM SERPL-SCNC: 3.5 MMOL/L (ref 3.5–5.1)
PROT SERPL-MCNC: 6.4 G/DL (ref 5.7–8.2)
PSA SERPL-MCNC: 0.27 NG/ML (ref ?–4)
SODIUM SERPL-SCNC: 145 MMOL/L (ref 136–145)
TRIGL SERPL-MCNC: 158 MG/DL (ref ?–150)